# Patient Record
Sex: FEMALE | Race: WHITE | HISPANIC OR LATINO | Employment: OTHER | URBAN - METROPOLITAN AREA
[De-identification: names, ages, dates, MRNs, and addresses within clinical notes are randomized per-mention and may not be internally consistent; named-entity substitution may affect disease eponyms.]

---

## 2017-07-21 ENCOUNTER — HOSPITAL ENCOUNTER (EMERGENCY)
Facility: MEDICAL CENTER | Age: 37
End: 2017-07-21
Attending: EMERGENCY MEDICINE

## 2017-07-21 VITALS
SYSTOLIC BLOOD PRESSURE: 128 MMHG | DIASTOLIC BLOOD PRESSURE: 83 MMHG | OXYGEN SATURATION: 97 % | BODY MASS INDEX: 41.45 KG/M2 | HEART RATE: 85 BPM | WEIGHT: 257.94 LBS | RESPIRATION RATE: 18 BRPM | HEIGHT: 66 IN | TEMPERATURE: 98.7 F

## 2017-07-21 DIAGNOSIS — L03.114 CELLULITIS OF LEFT UPPER EXTREMITY: ICD-10-CM

## 2017-07-21 PROCEDURE — A9270 NON-COVERED ITEM OR SERVICE: HCPCS | Performed by: EMERGENCY MEDICINE

## 2017-07-21 PROCEDURE — 700102 HCHG RX REV CODE 250 W/ 637 OVERRIDE(OP): Performed by: EMERGENCY MEDICINE

## 2017-07-21 PROCEDURE — 99284 EMERGENCY DEPT VISIT MOD MDM: CPT

## 2017-07-21 RX ORDER — CEPHALEXIN 250 MG/1
500 CAPSULE ORAL EVERY 6 HOURS
Status: DISCONTINUED | OUTPATIENT
Start: 2017-07-21 | End: 2017-07-21 | Stop reason: HOSPADM

## 2017-07-21 RX ORDER — CEPHALEXIN 500 MG/1
500 CAPSULE ORAL 4 TIMES DAILY
Refills: 0 | OUTPATIENT
Start: 2017-07-21

## 2017-07-21 RX ORDER — DIPHENHYDRAMINE HCL 25 MG
25 TABLET ORAL ONCE
Status: COMPLETED | OUTPATIENT
Start: 2017-07-21 | End: 2017-07-21

## 2017-07-21 RX ORDER — CEPHALEXIN 500 MG/1
500 CAPSULE ORAL 4 TIMES DAILY
Qty: 40 CAP | Refills: 0 | Status: ON HOLD | OUTPATIENT
Start: 2017-07-21 | End: 2020-12-21

## 2017-07-21 RX ADMIN — DIPHENHYDRAMINE HCL 25 MG: 25 TABLET ORAL at 18:59

## 2017-07-21 RX ADMIN — CEPHALEXIN 500 MG: 250 CAPSULE ORAL at 18:59

## 2017-07-21 NOTE — ED AVS SNAPSHOT
Greenlots Access Code: [email or web address]  Expires: 8/20/2017  7:02 PM    Your email address is not on file at [facility].  Email Addresses are required for you to sign up for Greenlots, please contact [phone] to verify your personal information and to provide your email address prior to attempting to register for Greenlots.    Lidia Salcedo  [address] [place] [address]    Greenlots  A secure, online tool to manage your health information     [facility]’s Greenlots® is a secure, online tool that connects you to your personalized health information from the privacy of your home -- day or night - making it very easy for you to manage your healthcare. Once the activation process is completed, you can even access your medical information using the Greenlots kayy, which is available for free in the Apple Kayy store or Google Play store.     To learn more about Greenlots, visit [email or web address]    There are two levels of access available (as shown below):   My Chart Features  [facility] Primary Care Doctor [facility]  Specialists [facility]  Urgent  Care Non-[facility] Primary Care Doctor   Email your healthcare team securely and privately 24/7 X X X    Manage appointments: schedule your next appointment; view details of past/upcoming appointments X      Request prescription refills. X      View recent personal medical records, including lab and immunizations X X X X   View health record, including health history, allergies, medications X X X X   Read reports about your outpatient visits, procedures, consult and ER notes X X X X   See your discharge summary, which is a recap of your hospital and/or ER visit that includes your diagnosis, lab results, and care plan X X  X     How to register for [facility]:  Once your e-mail address has been verified, follow the following steps to sign up for Greenlots.     1. Go to  [email or web address]  2. Click on the Sign Up Now box, which takes you to the New Member Sign Up page. You will  need to provide the following information:  a. Enter your [employer] Access Code exactly as it appears at the top of this page. (You will not need to use this code after you’ve completed the sign-up process. If you do not sign up before the expiration date, you must request a new code.)   b. Enter your date of birth.   c. Enter your home email address.   d. Click Submit, and follow the next screen’s instructions.  3. Create a Bowntyt ID. This will be your [facility] login ID and cannot be changed, so think of one that is secure and easy to remember.  4. Create a [employer] password. You can change your password at any time.  5. Enter your Password Reset Question and Answer. This can be used at a later time if you forget your password.   6. Enter your e-mail address. This allows you to receive e-mail notifications when new information is available in [employer].  7. Click Sign Up. You can now view your health information.    For assistance activating your [employer] account, call [phone]

## 2017-07-21 NOTE — ED AVS SNAPSHOT
Home Care Instructions                                                                                                                Lidia Salcedo   MRN: [id number]    Department:  [facility], Emergency Dept   Date of Visit:  7/21/2017            [facility], Emergency Dept    [address]    [place] [address]    Phone:  [phone]      You were seen by     Antwon Pedro D.O.      Your Diagnosis Was     Cellulitis of left upper extremity     L03.114       These are the medications you received during your hospitalization from 07/21/2017 1735 to 07/21/2017 1912     Date/Time Order Dose Route Action    07/21/2017 1859 diphenhydrAMINE (BENADRYL) tablet/capsule 25 mg 25 mg Oral Given    07/21/2017 1859 cephALEXin (KEFLEX) capsule 500 mg 500 mg Oral Given      Follow-up Information     1. Follow up with [facility].    Contact information    [facility]  [place] [id number] [phone]        Medication Information     Review all of your home medications and newly ordered medications with your primary doctor and/or pharmacist as soon as possible. Follow medication instructions as directed by your doctor and/or pharmacist.     Please keep your complete medication list with you and share with your physician. Update the information when medications are discontinued, doses are changed, or new medications (including over-the-counter products) are added; and carry medication information at all times in the event of emergency situations.               Medication List      START taking these medications        Instructions    Morning Afternoon Evening Bedtime    cephALEXin 500 MG Caps   Last time this was given:  500 mg on 7/21/2017  6:59 PM   Commonly known as:  KEFLEX        Take 1 Cap by mouth 4 times a day.   Dose:  500 mg                             Where to Get Your Medications      You can get these medications from any pharmacy     Bring a paper  prescription for each of these medications    - cephALEXin 500 MG Caps              Discharge Instructions       Cellulitis  Cellulitis is an infection of the skin and the tissue beneath it. The infected area is usually red and tender. Cellulitis occurs most often in the arms and lower legs.   CAUSES   Cellulitis is caused by bacteria that enter the skin through cracks or cuts in the skin. The most common types of bacteria that cause cellulitis are staphylococci and streptococci.  SIGNS AND SYMPTOMS   · Redness and warmth.  · Swelling.  · Tenderness or pain.  · Fever.  DIAGNOSIS   Your health care provider can usually determine what is wrong based on a physical exam. Blood tests may also be done.  TREATMENT   Treatment usually involves taking an antibiotic medicine.  HOME CARE INSTRUCTIONS   · Take your antibiotic medicine as directed by your health care provider. Finish the antibiotic even if you start to feel better.  · Keep the infected arm or leg elevated to reduce swelling.  · Apply a warm cloth to the affected area up to 4 times per day to relieve pain.  · Take medicines only as directed by your health care provider.  · Keep all follow-up visits as directed by your health care provider.  SEEK MEDICAL CARE IF:   · You notice red streaks coming from the infected area.  · Your red area gets larger or turns dark in color.  · Your bone or joint underneath the infected area becomes painful after the skin has healed.  · Your infection returns in the same area or another area.  · You notice a swollen bump in the infected area.  · You develop new symptoms.  · You have a fever.  SEEK IMMEDIATE MEDICAL CARE IF:   · You feel very sleepy.  · You develop vomiting or diarrhea.  · You have a general ill feeling (malaise) with muscle aches and pains.  MAKE SURE YOU:   · Understand these instructions.  · Will watch your condition.  · Will get help right away if you are not doing well or get worse.     This information is not  intended to replace advice given to you by your health care provider. Make sure you discuss any questions you have with your health care provider.     Document Released: 09/27/2006 Document Revised: 01/08/2016 Document Reviewed: 03/04/2013  Elsevier Interactive Patient Education ©2016 Elsevier Inc.            Patient Information     Patient Information    Following emergency treatment: all patient requiring follow-up care must return either to a private physician or a clinic if your condition worsens before you are able to obtain further medical attention, please return to the emergency room.     Billing Information    At [facility], we work to make the billing process streamlined for our patients.  Our Representatives are here to answer any questions you may have regarding your hospital bill.  If you have insurance coverage and have supplied your insurance information to us, we will submit a claim to your insurer on your behalf.  Should you have any questions regarding your bill, we can be reached online or by phone as follows:  Online: You are able pay your bills online or live chat with our representatives about any billing questions you may have. We are here to help Monday - Friday from 8:00am to 7:30pm and 9:00am - 12:00pm on Saturdays.  Please visit [email or web address]  for more information.   Phone:  [phone] or [phone]    Please note that your emergency physician, surgeon, pathologist, radiologist, anesthesiologist, and other specialists are not employed by [facility] and will therefore bill separately for their services.  Please contact them directly for any questions concerning their bills at the numbers below:     Emergency Physician Services:  [phone]  [facility]:  [phone]  [facility]:  [phone]  [facility]:  [phone]    1. Your final bill may vary from the amount quoted upon discharge if all  procedures are not complete at that time, or if your doctor has additional procedures of which we are not aware. You will receive an additional bill if you return to the Emergency Department at [facility] for suture removal regardless of the facility of which the sutures were placed.     2. Please arrange for settlement of this account at the emergency registration.    3. All self-pay accounts are due in full at the time of treatment.  If you are unable to meet this obligation then payment is expected within 4-5 days.     4. If you have had radiology studies (CT, X-ray, Ultrasound, MRI), you have received a preliminary result during your emergency department visit. Please contact the radiology department [phone] to receive a copy of your final result. Please discuss the Final result with your primary physician or with the follow up physician provided.     Crisis Hotline:  [place] Crisis Hotline:  8-360-QRUZUTP or 1-308.539.7995  [place] Crisis Hotline:    1-960.134.4270 or 593-367-4840         ED Discharge Follow Up Questions    1. In order to provide you with very good care, we would like to follow up with a phone call in the next few days.  May we have your permission to contact you?     YES /  NO    2. What is the best phone number to call you? (       )_____-__________    3. What is the best time to call you?      Morning  /  Afternoon  /  Evening                   Patient Signature:  ____________________________________________________________    Date:  ____________________________________________________________

## 2017-07-21 NOTE — ED AVS SNAPSHOT
7/21/2017    Lidia Salcedo  [address] [place] [address]    Dear Lidia:    [facility] wants to ensure your discharge home is safe and you or your loved ones have had all of your questions answered regarding your care after you leave the hospital.    Below is a list of resources and contact information should you have any questions regarding your hospital stay, follow-up instructions, or active medical symptoms.    Questions or Concerns Regarding… Contact   Medical Questions Related to Your Discharge  (7 days a week, 8am-5pm) Contact a Nurse Care Coordinator   [phone]   Medical Questions Not Related to Your Discharge  (24 hours a day / 7 days a week)  Contact the Nurse Health Line   [phone]    Medications or Discharge Instructions Refer to your discharge packet   or contact your [facility] Primary Care Provider   [phone]   Follow-up Appointment(s) Schedule your appointment via PartyLine   or contact Scheduling [phone]   Billing Review your statement via PartyLine  or contact Billing [phone]   Medical Records Review your records via PartyLine   or contact Medical Records [phone]     You may receive a telephone call within two days of discharge. This call is to make certain you understand your discharge instructions and have the opportunity to have any questions answered. You can also easily access your medical information, test results and upcoming appointments via the PartyLine free online health management tool. You can learn more and sign up at [employer]/PartyLine. For assistance setting up your PartyLine account, please call [phone].    Once again, we want to ensure your discharge home is safe and that you have a clear understanding of any next steps in your care. If you have any questions or concerns, please do not hesitate to contact us, we are here for you. Thank you for choosing [facility] for your healthcare needs.    Sincerely,    Your [facility] Healthcare Team

## 2017-07-22 NOTE — ED NOTES
Patient and caregiver verbalized understanding of discharge instructions, no questions at this time. VS stable, patient will ambulate to exit with d/c instructions and rx in hand.

## 2017-07-22 NOTE — ED NOTES
ERP aware antibiotic order entered incorrectly. Single dose given as ordered by ERP verbal instruction.

## 2017-07-22 NOTE — ED NOTES
Pt BIB caregiver for evaluation of L elbow redness, inflamation. Area hot to touch. Pt reports pain and itching at site, unaware of injury of bite.

## 2017-07-22 NOTE — ED PROVIDER NOTES
"ED Provider Note    CHIEF COMPLAINT  Chief Complaint   Patient presents with   • Elbow Pain       HPI  Lidia Salcedo is a 37 y.o. female here for evaluation of left elbow redness. Patient is here with family, and states that she is unsure as to when the redness occurred. It is located to the medial part of the elbow, but the patient has no pain with moving the elbow, and no fevers. She is not taking anything for the discomfort or pain, and denies any history of the same. She does report some itching. She has no vomiting, no chest, no shortness of breath.    PAST MEDICAL HISTORY    noncontributory    SOCIAL HISTORY  Social History     Social History Main Topics   • Smoking status: Not on file   • Smokeless tobacco: Not on file   • Alcohol Use: Not on file   • Drug Use: Not on file   • Sexual Activity: Not on file       SURGICAL HISTORY  patient denies any surgical history    CURRENT MEDICATIONS  Home Medications     **Home medications have not yet been reviewed for this encounter**          ALLERGIES  Allergies not on file    REVIEW OF SYSTEMS  See HPI for further details. Review of systems as above, otherwise all other systems are negative.     PHYSICAL EXAM  VITAL SIGNS: /80 mmHg  Pulse 90  Temp(Src) 36.7 °C (98 °F)  Resp 18  Ht 1.676 m (5' 6\")  Wt 117 kg (257 lb 15 oz)  BMI 41.65 kg/m2  SpO2 96%    Constitutional: No distress. Well nourished.  HENT: Head is atraumatic. Oropharynx is moist.   Eyes: Conjunctivae are normal. EOMI.   Respiratory: No respiratory distress. Equal chest expansion.   Musculoskeletal: Normal range of motion. No edema.   Neurological: Alert. No focal deficits noted.    Skin: No rash. No Pallor. Left upper extremity, medial aspect of the olecranon, with 4 x 4 area of erythema, no induration, and not circumferential. No pain with range of motion  Psych: Appropriate for clinical situation. Normal affect.      PROCEDURES     MEDICAL RECORD  I have reviewed patient's medical " record and pertinent results are listed above.    COURSE & MEDICAL DECISION MAKING  I have reviewed any medical record information, laboratory studies and radiographic results as noted above.    7:08 PM  The patient is nontoxic-appearing, afebrile, and is no pain with range of motion of the elbow. She's been given Keflex here, and her family agrees to get follow-up in the next 2 days. She will return here for anything further. The area in question has no induration, and is not circumferential.      Differential diagnoses include but not limited to: Septic joint, abscess, cellulitis    This patient presents with elbow cellulitis .  At this time, I have counseled the patient/family regarding their medications, pain control, and follow up.  They will continue their medications, if any, as prescribed.  They will return immediately for any worsening symptoms and/or any other medical concerns.  They will see their doctor, or contact the doctor provided, in 1-2 days for follow up.       FINAL IMPRESSION  Left elbow cellulitis      Electronically signed by: Antwon Pedro, 7/21/2017 7:05 PM

## 2019-01-28 ENCOUNTER — HOSPITAL ENCOUNTER (OUTPATIENT)
Dept: LAB | Facility: MEDICAL CENTER | Age: 39
End: 2019-01-28
Attending: CLINICAL NURSE SPECIALIST
Payer: COMMERCIAL

## 2019-01-28 ENCOUNTER — TELEPHONE (OUTPATIENT)
Dept: LAB | Facility: MEDICAL CENTER | Age: 39
End: 2019-01-28

## 2019-01-28 ENCOUNTER — HOSPITAL ENCOUNTER (OUTPATIENT)
Facility: MEDICAL CENTER | Age: 39
End: 2019-01-28
Attending: FAMILY MEDICINE
Payer: COMMERCIAL

## 2019-01-28 LAB
ANION GAP SERPL CALC-SCNC: 10 MMOL/L (ref 0–11.9)
BUN SERPL-MCNC: 11 MG/DL (ref 8–22)
CALCIUM SERPL-MCNC: 9.5 MG/DL (ref 8.5–10.5)
CHLORIDE SERPL-SCNC: 108 MMOL/L (ref 96–112)
CO2 SERPL-SCNC: 24 MMOL/L (ref 20–33)
CREAT SERPL-MCNC: 0.83 MG/DL (ref 0.5–1.4)
GLUCOSE SERPL-MCNC: 114 MG/DL (ref 65–99)
POTASSIUM SERPL-SCNC: 4 MMOL/L (ref 3.6–5.5)
SODIUM SERPL-SCNC: 142 MMOL/L (ref 135–145)
VALPROATE SERPL-MCNC: 102.2 UG/ML (ref 50–100)

## 2019-01-28 PROCEDURE — 80164 ASSAY DIPROPYLACETIC ACD TOT: CPT

## 2019-01-28 PROCEDURE — 36415 COLL VENOUS BLD VENIPUNCTURE: CPT

## 2019-01-28 PROCEDURE — 80048 BASIC METABOLIC PNL TOTAL CA: CPT

## 2019-10-17 ENCOUNTER — HOSPITAL ENCOUNTER (OUTPATIENT)
Dept: RADIOLOGY | Facility: MEDICAL CENTER | Age: 39
End: 2019-10-17
Attending: PODIATRIST
Payer: COMMERCIAL

## 2019-10-17 DIAGNOSIS — M19.072 ARTHRITIS OF LEFT ANKLE: ICD-10-CM

## 2019-10-17 DIAGNOSIS — M79.672 LEFT FOOT PAIN: ICD-10-CM

## 2019-10-17 DIAGNOSIS — M84.375A STRESS FRACTURE OF LEFT FOOT, INITIAL ENCOUNTER: ICD-10-CM

## 2019-10-17 PROCEDURE — 73630 X-RAY EXAM OF FOOT: CPT | Mod: LT

## 2019-10-17 PROCEDURE — 73610 X-RAY EXAM OF ANKLE: CPT | Mod: LT

## 2020-02-08 ENCOUNTER — HOSPITAL ENCOUNTER (OUTPATIENT)
Dept: LAB | Facility: MEDICAL CENTER | Age: 40
End: 2020-02-08
Attending: CLINICAL NURSE SPECIALIST
Payer: COMMERCIAL

## 2020-02-08 ENCOUNTER — HOSPITAL ENCOUNTER (OUTPATIENT)
Dept: LAB | Facility: MEDICAL CENTER | Age: 40
End: 2020-02-08
Attending: INTERNAL MEDICINE
Payer: COMMERCIAL

## 2020-02-08 LAB
ALBUMIN SERPL BCP-MCNC: 4 G/DL (ref 3.2–4.9)
ALBUMIN/GLOB SERPL: 1.1 G/DL
ALP SERPL-CCNC: 57 U/L (ref 30–99)
ALT SERPL-CCNC: 21 U/L (ref 2–50)
ANION GAP SERPL CALC-SCNC: 10 MMOL/L (ref 0–11.9)
AST SERPL-CCNC: 20 U/L (ref 12–45)
BASOPHILS # BLD AUTO: 0.6 % (ref 0–1.8)
BASOPHILS # BLD: 0.05 K/UL (ref 0–0.12)
BILIRUB SERPL-MCNC: 0.4 MG/DL (ref 0.1–1.5)
BUN SERPL-MCNC: 11 MG/DL (ref 8–22)
CALCIUM SERPL-MCNC: 9.3 MG/DL (ref 8.5–10.5)
CHLORIDE SERPL-SCNC: 101 MMOL/L (ref 96–112)
CO2 SERPL-SCNC: 24 MMOL/L (ref 20–33)
CREAT SERPL-MCNC: 0.62 MG/DL (ref 0.5–1.4)
EOSINOPHIL # BLD AUTO: 0.03 K/UL (ref 0–0.51)
EOSINOPHIL NFR BLD: 0.4 % (ref 0–6.9)
ERYTHROCYTE [DISTWIDTH] IN BLOOD BY AUTOMATED COUNT: 42.5 FL (ref 35.9–50)
EST. AVERAGE GLUCOSE BLD GHB EST-MCNC: 131 MG/DL
GLOBULIN SER CALC-MCNC: 3.5 G/DL (ref 1.9–3.5)
GLUCOSE SERPL-MCNC: 92 MG/DL (ref 65–99)
HBA1C MFR BLD: 6.2 % (ref 0–5.6)
HCT VFR BLD AUTO: 48.6 % (ref 37–47)
HGB BLD-MCNC: 15.9 G/DL (ref 12–16)
IMM GRANULOCYTES # BLD AUTO: 0.06 K/UL (ref 0–0.11)
IMM GRANULOCYTES NFR BLD AUTO: 0.8 % (ref 0–0.9)
LYMPHOCYTES # BLD AUTO: 3.48 K/UL (ref 1–4.8)
LYMPHOCYTES NFR BLD: 44.5 % (ref 22–41)
MCH RBC QN AUTO: 31.4 PG (ref 27–33)
MCHC RBC AUTO-ENTMCNC: 32.7 G/DL (ref 33.6–35)
MCV RBC AUTO: 95.9 FL (ref 81.4–97.8)
MONOCYTES # BLD AUTO: 0.59 K/UL (ref 0–0.85)
MONOCYTES NFR BLD AUTO: 7.5 % (ref 0–13.4)
NEUTROPHILS # BLD AUTO: 3.61 K/UL (ref 2–7.15)
NEUTROPHILS NFR BLD: 46.2 % (ref 44–72)
NRBC # BLD AUTO: 0 K/UL
NRBC BLD-RTO: 0 /100 WBC
PLATELET # BLD AUTO: 245 K/UL (ref 164–446)
PMV BLD AUTO: 11.6 FL (ref 9–12.9)
POTASSIUM SERPL-SCNC: 4 MMOL/L (ref 3.6–5.5)
PROT SERPL-MCNC: 7.5 G/DL (ref 6–8.2)
RBC # BLD AUTO: 5.07 M/UL (ref 4.2–5.4)
SODIUM SERPL-SCNC: 135 MMOL/L (ref 135–145)
TSH SERPL DL<=0.005 MIU/L-ACNC: 2.42 UIU/ML (ref 0.38–5.33)
VALPROATE SERPL-MCNC: 74.6 UG/ML (ref 50–100)
WBC # BLD AUTO: 7.8 K/UL (ref 4.8–10.8)

## 2020-02-08 PROCEDURE — 83036 HEMOGLOBIN GLYCOSYLATED A1C: CPT

## 2020-02-08 PROCEDURE — 80053 COMPREHEN METABOLIC PANEL: CPT

## 2020-02-08 PROCEDURE — 85025 COMPLETE CBC W/AUTO DIFF WBC: CPT

## 2020-02-08 PROCEDURE — 36415 COLL VENOUS BLD VENIPUNCTURE: CPT

## 2020-02-08 PROCEDURE — 84443 ASSAY THYROID STIM HORMONE: CPT

## 2020-02-08 PROCEDURE — 80164 ASSAY DIPROPYLACETIC ACD TOT: CPT

## 2020-12-01 ENCOUNTER — HOSPITAL ENCOUNTER (OUTPATIENT)
Dept: LAB | Facility: MEDICAL CENTER | Age: 40
End: 2020-12-01
Attending: STUDENT IN AN ORGANIZED HEALTH CARE EDUCATION/TRAINING PROGRAM
Payer: COMMERCIAL

## 2020-12-01 LAB
ALBUMIN SERPL BCP-MCNC: 3.9 G/DL (ref 3.2–4.9)
ALBUMIN/GLOB SERPL: 1.2 G/DL
ALP SERPL-CCNC: 67 U/L (ref 30–99)
ALT SERPL-CCNC: 21 U/L (ref 2–50)
ANION GAP SERPL CALC-SCNC: 6 MMOL/L (ref 7–16)
AST SERPL-CCNC: 12 U/L (ref 12–45)
BASOPHILS # BLD AUTO: 0.4 % (ref 0–1.8)
BASOPHILS # BLD: 0.04 K/UL (ref 0–0.12)
BILIRUB SERPL-MCNC: 0.3 MG/DL (ref 0.1–1.5)
BUN SERPL-MCNC: 9 MG/DL (ref 8–22)
CALCIUM SERPL-MCNC: 9.2 MG/DL (ref 8.5–10.5)
CHLORIDE SERPL-SCNC: 108 MMOL/L (ref 96–112)
CHOLEST SERPL-MCNC: 215 MG/DL (ref 100–199)
CO2 SERPL-SCNC: 26 MMOL/L (ref 20–33)
CREAT SERPL-MCNC: 0.75 MG/DL (ref 0.5–1.4)
EOSINOPHIL # BLD AUTO: 0.02 K/UL (ref 0–0.51)
EOSINOPHIL NFR BLD: 0.2 % (ref 0–6.9)
ERYTHROCYTE [DISTWIDTH] IN BLOOD BY AUTOMATED COUNT: 44.3 FL (ref 35.9–50)
EST. AVERAGE GLUCOSE BLD GHB EST-MCNC: 123 MG/DL
FASTING STATUS PATIENT QL REPORTED: NORMAL
GLOBULIN SER CALC-MCNC: 3.2 G/DL (ref 1.9–3.5)
GLUCOSE SERPL-MCNC: 94 MG/DL (ref 65–99)
HBA1C MFR BLD: 5.9 % (ref 0–5.6)
HCT VFR BLD AUTO: 49.3 % (ref 37–47)
HDLC SERPL-MCNC: 45 MG/DL
HGB BLD-MCNC: 15.9 G/DL (ref 12–16)
IMM GRANULOCYTES # BLD AUTO: 0.07 K/UL (ref 0–0.11)
IMM GRANULOCYTES NFR BLD AUTO: 0.7 % (ref 0–0.9)
LDLC SERPL CALC-MCNC: 128 MG/DL
LYMPHOCYTES # BLD AUTO: 3.82 K/UL (ref 1–4.8)
LYMPHOCYTES NFR BLD: 38.2 % (ref 22–41)
MCH RBC QN AUTO: 31.3 PG (ref 27–33)
MCHC RBC AUTO-ENTMCNC: 32.3 G/DL (ref 33.6–35)
MCV RBC AUTO: 97 FL (ref 81.4–97.8)
MONOCYTES # BLD AUTO: 0.67 K/UL (ref 0–0.85)
MONOCYTES NFR BLD AUTO: 6.7 % (ref 0–13.4)
NEUTROPHILS # BLD AUTO: 5.38 K/UL (ref 2–7.15)
NEUTROPHILS NFR BLD: 53.8 % (ref 44–72)
NRBC # BLD AUTO: 0 K/UL
NRBC BLD-RTO: 0 /100 WBC
PLATELET # BLD AUTO: 228 K/UL (ref 164–446)
PMV BLD AUTO: 12.9 FL (ref 9–12.9)
POTASSIUM SERPL-SCNC: 4.3 MMOL/L (ref 3.6–5.5)
PROT SERPL-MCNC: 7.1 G/DL (ref 6–8.2)
RBC # BLD AUTO: 5.08 M/UL (ref 4.2–5.4)
SODIUM SERPL-SCNC: 140 MMOL/L (ref 135–145)
TRIGL SERPL-MCNC: 210 MG/DL (ref 0–149)
WBC # BLD AUTO: 10 K/UL (ref 4.8–10.8)

## 2020-12-01 PROCEDURE — 36415 COLL VENOUS BLD VENIPUNCTURE: CPT

## 2020-12-01 PROCEDURE — 83036 HEMOGLOBIN GLYCOSYLATED A1C: CPT

## 2020-12-01 PROCEDURE — 80053 COMPREHEN METABOLIC PANEL: CPT

## 2020-12-01 PROCEDURE — 80061 LIPID PANEL: CPT

## 2020-12-01 PROCEDURE — 85025 COMPLETE CBC W/AUTO DIFF WBC: CPT

## 2020-12-18 ENCOUNTER — HOSPITAL ENCOUNTER (INPATIENT)
Facility: MEDICAL CENTER | Age: 40
LOS: 2 days | DRG: 177 | End: 2020-12-21
Attending: EMERGENCY MEDICINE | Admitting: STUDENT IN AN ORGANIZED HEALTH CARE EDUCATION/TRAINING PROGRAM
Payer: COMMERCIAL

## 2020-12-18 DIAGNOSIS — U07.1 LAB TEST POSITIVE FOR DETECTION OF COVID-19 VIRUS: ICD-10-CM

## 2020-12-18 DIAGNOSIS — U07.1 COVID-19: ICD-10-CM

## 2020-12-18 DIAGNOSIS — J96.01 ACUTE RESPIRATORY FAILURE WITH HYPOXIA (HCC): ICD-10-CM

## 2020-12-18 DIAGNOSIS — R11.2 NAUSEA AND VOMITING, INTRACTABILITY OF VOMITING NOT SPECIFIED, UNSPECIFIED VOMITING TYPE: ICD-10-CM

## 2020-12-18 DIAGNOSIS — R50.81 FEVER IN OTHER DISEASES: ICD-10-CM

## 2020-12-18 DIAGNOSIS — R62.50 DEVELOPMENTAL DELAY: ICD-10-CM

## 2020-12-18 PROCEDURE — 94760 N-INVAS EAR/PLS OXIMETRY 1: CPT

## 2020-12-18 PROCEDURE — 99285 EMERGENCY DEPT VISIT HI MDM: CPT

## 2020-12-18 ASSESSMENT — FIBROSIS 4 INDEX: FIB4 SCORE: 0.47

## 2020-12-19 ENCOUNTER — APPOINTMENT (OUTPATIENT)
Dept: RADIOLOGY | Facility: MEDICAL CENTER | Age: 40
DRG: 177 | End: 2020-12-19
Attending: EMERGENCY MEDICINE
Payer: COMMERCIAL

## 2020-12-19 PROBLEM — U07.1 COVID-19: Status: ACTIVE | Noted: 2020-12-19

## 2020-12-19 PROBLEM — J96.01 ACUTE RESPIRATORY FAILURE WITH HYPOXIA (HCC): Status: ACTIVE | Noted: 2020-12-19

## 2020-12-19 LAB
ANION GAP SERPL CALC-SCNC: 11 MMOL/L (ref 7–16)
APPEARANCE UR: ABNORMAL
BACTERIA #/AREA URNS HPF: ABNORMAL /HPF
BASOPHILS # BLD AUTO: 0 % (ref 0–1.8)
BASOPHILS # BLD: 0 K/UL (ref 0–0.12)
BILIRUB UR QL STRIP.AUTO: ABNORMAL
BUN SERPL-MCNC: 8 MG/DL (ref 8–22)
CALCIUM SERPL-MCNC: 8.8 MG/DL (ref 8.4–10.2)
CHLORIDE SERPL-SCNC: 103 MMOL/L (ref 96–112)
CO2 SERPL-SCNC: 27 MMOL/L (ref 20–33)
COLOR UR: YELLOW
CREAT SERPL-MCNC: 0.74 MG/DL (ref 0.5–1.4)
CRP SERPL HS-MCNC: 0.78 MG/DL (ref 0–0.75)
D DIMER PPP IA.FEU-MCNC: 0.36 UG/ML (FEU) (ref 0–0.5)
EOSINOPHIL # BLD AUTO: 0 K/UL (ref 0–0.51)
EOSINOPHIL NFR BLD: 0 % (ref 0–6.9)
EPI CELLS #/AREA URNS HPF: ABNORMAL /HPF
ERYTHROCYTE [DISTWIDTH] IN BLOOD BY AUTOMATED COUNT: 44.2 FL (ref 35.9–50)
ERYTHROCYTE [SEDIMENTATION RATE] IN BLOOD BY WESTERGREN METHOD: 15 MM/HOUR (ref 0–20)
GLUCOSE SERPL-MCNC: 157 MG/DL (ref 65–99)
GLUCOSE UR STRIP.AUTO-MCNC: NEGATIVE MG/DL
HCT VFR BLD AUTO: 47.4 % (ref 37–47)
HGB BLD-MCNC: 15.5 G/DL (ref 12–16)
KETONES UR STRIP.AUTO-MCNC: ABNORMAL MG/DL
LEUKOCYTE ESTERASE UR QL STRIP.AUTO: NEGATIVE
LG PLATELETS BLD QL SMEAR: NORMAL
LYMPHOCYTES # BLD AUTO: 2.92 K/UL (ref 1–4.8)
LYMPHOCYTES NFR BLD: 43 % (ref 22–41)
MANUAL DIFF BLD: NORMAL
MCH RBC QN AUTO: 31.1 PG (ref 27–33)
MCHC RBC AUTO-ENTMCNC: 32.7 G/DL (ref 33.6–35)
MCV RBC AUTO: 95.2 FL (ref 81.4–97.8)
MICRO URNS: ABNORMAL
MONOCYTES # BLD AUTO: 0.88 K/UL (ref 0–0.85)
MONOCYTES NFR BLD AUTO: 13 % (ref 0–13.4)
MUCOUS THREADS #/AREA URNS HPF: ABNORMAL /HPF
NEUTROPHILS # BLD AUTO: 2.99 K/UL (ref 2–7.15)
NEUTROPHILS NFR BLD: 38 % (ref 44–72)
NEUTS BAND NFR BLD MANUAL: 6 % (ref 0–10)
NITRITE UR QL STRIP.AUTO: NEGATIVE
NRBC # BLD AUTO: 0.03 K/UL
NRBC BLD-RTO: 0.4 /100 WBC
PH UR STRIP.AUTO: 6 [PH] (ref 5–8)
PLATELET # BLD AUTO: 353 K/UL (ref 164–446)
PLATELET BLD QL SMEAR: NORMAL
PMV BLD AUTO: 10.3 FL (ref 9–12.9)
POLYCHROMASIA BLD QL SMEAR: NORMAL
POTASSIUM SERPL-SCNC: 4.2 MMOL/L (ref 3.6–5.5)
PROT UR QL STRIP: NEGATIVE MG/DL
RBC # BLD AUTO: 4.98 M/UL (ref 4.2–5.4)
RBC # URNS HPF: ABNORMAL /HPF
RBC BLD AUTO: PRESENT
RBC UR QL AUTO: ABNORMAL
SODIUM SERPL-SCNC: 141 MMOL/L (ref 135–145)
SP GR UR REFRACTOMETRY: 1.03
VARIANT LYMPHS BLD QL SMEAR: NORMAL
WBC # BLD AUTO: 6.8 K/UL (ref 4.8–10.8)
WBC #/AREA URNS HPF: ABNORMAL /HPF

## 2020-12-19 PROCEDURE — 99223 1ST HOSP IP/OBS HIGH 75: CPT | Performed by: STUDENT IN AN ORGANIZED HEALTH CARE EDUCATION/TRAINING PROGRAM

## 2020-12-19 PROCEDURE — 700111 HCHG RX REV CODE 636 W/ 250 OVERRIDE (IP): Performed by: STUDENT IN AN ORGANIZED HEALTH CARE EDUCATION/TRAINING PROGRAM

## 2020-12-19 PROCEDURE — A9270 NON-COVERED ITEM OR SERVICE: HCPCS | Performed by: EMERGENCY MEDICINE

## 2020-12-19 PROCEDURE — 700102 HCHG RX REV CODE 250 W/ 637 OVERRIDE(OP): Performed by: STUDENT IN AN ORGANIZED HEALTH CARE EDUCATION/TRAINING PROGRAM

## 2020-12-19 PROCEDURE — 86140 C-REACTIVE PROTEIN: CPT

## 2020-12-19 PROCEDURE — 770021 HCHG ROOM/CARE - ISO PRIVATE

## 2020-12-19 PROCEDURE — A9270 NON-COVERED ITEM OR SERVICE: HCPCS | Performed by: STUDENT IN AN ORGANIZED HEALTH CARE EDUCATION/TRAINING PROGRAM

## 2020-12-19 PROCEDURE — 71045 X-RAY EXAM CHEST 1 VIEW: CPT

## 2020-12-19 PROCEDURE — 80048 BASIC METABOLIC PNL TOTAL CA: CPT

## 2020-12-19 PROCEDURE — 85379 FIBRIN DEGRADATION QUANT: CPT

## 2020-12-19 PROCEDURE — 85652 RBC SED RATE AUTOMATED: CPT

## 2020-12-19 PROCEDURE — 85007 BL SMEAR W/DIFF WBC COUNT: CPT

## 2020-12-19 PROCEDURE — 81001 URINALYSIS AUTO W/SCOPE: CPT

## 2020-12-19 PROCEDURE — 85027 COMPLETE CBC AUTOMATED: CPT

## 2020-12-19 PROCEDURE — 700102 HCHG RX REV CODE 250 W/ 637 OVERRIDE(OP): Performed by: EMERGENCY MEDICINE

## 2020-12-19 RX ORDER — CITALOPRAM 20 MG/1
20 TABLET ORAL EVERY MORNING
Status: DISCONTINUED | OUTPATIENT
Start: 2020-12-19 | End: 2020-12-21 | Stop reason: HOSPADM

## 2020-12-19 RX ORDER — ONDANSETRON 4 MG/1
4 TABLET, ORALLY DISINTEGRATING ORAL EVERY 6 HOURS PRN
Status: DISCONTINUED | OUTPATIENT
Start: 2020-12-19 | End: 2020-12-21 | Stop reason: HOSPADM

## 2020-12-19 RX ORDER — ONDANSETRON 2 MG/ML
4 INJECTION INTRAMUSCULAR; INTRAVENOUS EVERY 6 HOURS PRN
Status: DISCONTINUED | OUTPATIENT
Start: 2020-12-19 | End: 2020-12-21 | Stop reason: HOSPADM

## 2020-12-19 RX ORDER — DULOXETIN HYDROCHLORIDE 30 MG/1
30 CAPSULE, DELAYED RELEASE ORAL DAILY
Status: DISCONTINUED | OUTPATIENT
Start: 2020-12-19 | End: 2020-12-21 | Stop reason: HOSPADM

## 2020-12-19 RX ORDER — ACETAMINOPHEN 325 MG/1
650 TABLET ORAL EVERY 6 HOURS PRN
Status: DISCONTINUED | OUTPATIENT
Start: 2020-12-19 | End: 2020-12-21 | Stop reason: HOSPADM

## 2020-12-19 RX ORDER — DIVALPROEX SODIUM 250 MG/1
250 TABLET, EXTENDED RELEASE ORAL DAILY
Status: DISCONTINUED | OUTPATIENT
Start: 2020-12-19 | End: 2020-12-21 | Stop reason: HOSPADM

## 2020-12-19 RX ORDER — ASCORBIC ACID 500 MG
500 TABLET ORAL EVERY MORNING
Status: DISCONTINUED | OUTPATIENT
Start: 2020-12-19 | End: 2020-12-21 | Stop reason: HOSPADM

## 2020-12-19 RX ORDER — ALBUTEROL SULFATE 90 UG/1
2 AEROSOL, METERED RESPIRATORY (INHALATION) ONCE
Status: COMPLETED | OUTPATIENT
Start: 2020-12-19 | End: 2020-12-19

## 2020-12-19 RX ORDER — DEXAMETHASONE 4 MG/1
6 TABLET ORAL DAILY
Status: DISCONTINUED | OUTPATIENT
Start: 2020-12-19 | End: 2020-12-21 | Stop reason: HOSPADM

## 2020-12-19 RX ADMIN — ENOXAPARIN SODIUM 40 MG: 40 INJECTION SUBCUTANEOUS at 05:56

## 2020-12-19 RX ADMIN — OXYCODONE HYDROCHLORIDE AND ACETAMINOPHEN 500 MG: 500 TABLET ORAL at 05:55

## 2020-12-19 RX ADMIN — DULOXETINE HYDROCHLORIDE 30 MG: 30 CAPSULE, DELAYED RELEASE ORAL at 05:55

## 2020-12-19 RX ADMIN — DEXAMETHASONE 6 MG: 4 TABLET ORAL at 05:55

## 2020-12-19 RX ADMIN — ALBUTEROL SULFATE 2 PUFF: 90 AEROSOL, METERED RESPIRATORY (INHALATION) at 01:42

## 2020-12-19 RX ADMIN — CITALOPRAM HYDROBROMIDE 20 MG: 20 TABLET ORAL at 05:55

## 2020-12-19 RX ADMIN — DIVALPROEX SODIUM 250 MG: 250 TABLET, EXTENDED RELEASE ORAL at 12:03

## 2020-12-19 NOTE — ED PROVIDER NOTES
"CHIEF COMPLAINT  Chief Complaint   Patient presents with   • N/V   • Fever       HPI  Lidia Salcedo is a 40 y.o. female who presents tonight via ambulance from her group home with a chief complaint of hypoxia, fever, nausea with vomiting for the last 24 hours.  Patient apparently tested positive for Covid 2 days ago.  She is developmentally delayed therefore is unable to give any accurate history.  Most of the history was obtained from the paramedics who picked her up from the group home.    REVIEW OF SYSTEMS  See HPI for further details. All other system reviews are negative.    PAST MEDICAL HISTORY  Past Medical History:   Diagnosis Date   • Mental developmental delay     pt mentally \"6 years old\"   • Other specified symptom associated with female genital organs    • Unspecified urinary incontinence        FAMILY HISTORY  History reviewed. No pertinent family history.    SOCIAL HISTORY  Social History     Socioeconomic History   • Marital status: Single     Spouse name: Not on file   • Number of children: Not on file   • Years of education: Not on file   • Highest education level: Not on file   Occupational History   • Not on file   Social Needs   • Financial resource strain: Not on file   • Food insecurity     Worry: Not on file     Inability: Not on file   • Transportation needs     Medical: Not on file     Non-medical: Not on file   Tobacco Use   • Smoking status: Never Smoker   • Smokeless tobacco: Never Used   Substance and Sexual Activity   • Alcohol use: No   • Drug use: No   • Sexual activity: Not on file   Lifestyle   • Physical activity     Days per week: Not on file     Minutes per session: Not on file   • Stress: Not on file   Relationships   • Social connections     Talks on phone: Not on file     Gets together: Not on file     Attends Mu-ism service: Not on file     Active member of club or organization: Not on file     Attends meetings of clubs or organizations: Not on file     Relationship " status: Not on file   • Intimate partner violence     Fear of current or ex partner: Not on file     Emotionally abused: Not on file     Physically abused: Not on file     Forced sexual activity: Not on file   Other Topics Concern   • Not on file   Social History Narrative   • Not on file       SURGICAL HISTORY  Past Surgical History:   Procedure Laterality Date   • HYSTERECTOMY ROBOTIC XI  2/12/2015    Procedure: RIGHT SALPINGECTOMY;  Surgeon: Donald Damon M.D.;  Location: SURGERY [facility];  Service:    • OTHER      cyst removed, ovary removed       CURRENT MEDICATIONS  See nurses notes    ALLERGIES  Allergies   Allergen Reactions   • Nkda [No Known Drug Allergy]        PHYSICAL EXAM  VITAL SIGNS: /76   Pulse 93   Temp 36.3 °C (97.4 °F) (Temporal)   Resp 20   Wt 116.6 kg (257 lb)   SpO2 94%   BMI 41.48 kg/m²   Constitutional: Patient is well developed, well nourished in moderate distress.  HENT: Normocephalic,  Oropharynx moist without erythema or exudates, nose normal with no mucosal edema or drainage.   Eyes: PERRL, EOMI   Neck: Supple with  Normal range of motion in flexion, extension and lateral rotation. No tenderness along the bony prominences or paraspinal muscles.  Lymphatic: No lymphadenopathy noted.   Cardiovascular: Normal heart rate and rhythm. No murmur  Thorax & Lungs: Coarse breath sounds with diminished air exchange diffusely  Abdomen: Bowel sounds normal in all four quadrants. Soft,nontender, no rebound , guarding, palpable masses.   Skin: Warm, Dry, No erythema, No rashes.   Back: No cervical, thoracic, or lumbosacral tenderness.   Extremities: Peripheral pulses 4/4 No edema, No tenderness   Musculoskeletal: Normal range of motion in all major joints.   Neurologic: Alert , Normal motor function, Normal sensory function  Psychiatric: Patient is developmentally delayed.    EKG  Results for orders placed or performed during the hospital encounter of 12/18/20   CBC WITH DIFFERENTIAL    Result Value Ref Range    WBC 6.8 4.8 - 10.8 K/uL    RBC 4.98 4.20 - 5.40 M/uL    Hemoglobin 15.5 12.0 - 16.0 g/dL    Hematocrit 47.4 (H) 37.0 - 47.0 %    MCV 95.2 81.4 - 97.8 fL    MCH 31.1 27.0 - 33.0 pg    MCHC 32.7 (L) 33.6 - 35.0 g/dL    RDW 44.2 35.9 - 50.0 fL    Platelet Count 353 164 - 446 K/uL    MPV 10.3 9.0 - 12.9 fL    Neutrophils-Polys 38.00 (L) 44.00 - 72.00 %    Lymphocytes 43.00 (H) 22.00 - 41.00 %    Monocytes 13.00 0.00 - 13.40 %    Eosinophils 0.00 0.00 - 6.90 %    Basophils 0.00 0.00 - 1.80 %    Nucleated RBC 0.40 /100 WBC    Neutrophils (Absolute) 2.99 2.00 - 7.15 K/uL    Lymphs (Absolute) 2.92 1.00 - 4.80 K/uL    Monos (Absolute) 0.88 (H) 0.00 - 0.85 K/uL    Eos (Absolute) 0.00 0.00 - 0.51 K/uL    Baso (Absolute) 0.00 0.00 - 0.12 K/uL    NRBC (Absolute) 0.03 K/uL   BASIC METABOLIC PANEL   Result Value Ref Range    Sodium 141 135 - 145 mmol/L    Potassium 4.2 3.6 - 5.5 mmol/L    Chloride 103 96 - 112 mmol/L    Co2 27 20 - 33 mmol/L    Glucose 157 (H) 65 - 99 mg/dL    Bun 8 8 - 22 mg/dL    Creatinine 0.74 0.50 - 1.40 mg/dL    Calcium 8.8 8.4 - 10.2 mg/dL    Anion Gap 11.0 7.0 - 16.0   URINALYSIS (UA)    Specimen: Blood   Result Value Ref Range    Color Yellow     Character Hazy (A)     Ph 6.0 5.0 - 8.0    Glucose Negative Negative mg/dL    Ketones Trace (A) Negative mg/dL    Protein Negative Negative mg/dL    Bilirubin Small (A) Negative    Nitrite Negative Negative    Leukocyte Esterase Negative Negative    Occult Blood Trace (A) Negative    Micro Urine Req Microscopic    ESTIMATED GFR   Result Value Ref Range    GFR If African American >60 >60 mL/min/1.73 m 2    GFR If Non African American >60 >60 mL/min/1.73 m 2   REFRACTOMETER SG   Result Value Ref Range    Specific Gravity 1.030    URINE MICROSCOPIC (W/UA)   Result Value Ref Range    WBC 2-5 /hpf    RBC 0-2 /hpf    Bacteria Moderate (A) None /hpf    Epithelial Cells Moderate (A) Few /hpf    Mucous Threads Moderate /hpf   DIFFERENTIAL  MANUAL   Result Value Ref Range    Bands-Stabs 6.00 0.00 - 10.00 %    Manual Diff Status PERFORMED    PLATELET ESTIMATE   Result Value Ref Range    Plt Estimation Normal    MORPHOLOGY   Result Value Ref Range    RBC Morphology Present     Large Platelets 1+     Polychromia 1+     Reactive Lymphocytes Moderate    Sed Rate   Result Value Ref Range    Sed Rate Westergren 15 0 - 20 mm/hour   CRP QUANTITIVE (NON-CARDIAC)   Result Value Ref Range    Stat C-Reactive Protein 0.78 (H) 0.00 - 0.75 mg/dL   D-DIMER   Result Value Ref Range    D-Dimer Screen 0.36 0.00 - 0.50 ug/mL (FEU)         RADIOLOGY/PROCEDURES  DX-CHEST-PORTABLE (1 VIEW)   Final Result         1.  Patchy bilateral lung base opacities concerning for early infiltrates            COURSE & MEDICAL DECISION MAKING  Pertinent Labs & Imaging studies reviewed. (See chart for details)  Patient received an IV of normal saline, chest x-ray shows patchy bilateral opacities consistent with Covid.  D-dimer was negative C-reactive protein was slightly elevated at 0.78 sed rate was normal.  Her urinalysis is hazy but otherwise unremarkable.  Electrolytes are within normal limits.  White blood cell count is normal with a stable H&H.  She received albuterol inhalation solution and was placed on oxygen to keep her O2 sats greater than 92%.  She responded well to 2 L nasal cannula.  She will be admitted to the Covid unit for aggressive beta agonist therapy, steroids, oxygen.  She is currently in guarded condition.    FINAL IMPRESSION  1.  Positive COVID-19 virus  2.  Nausea with vomiting  3.  Fever  4.  Developemental delay         Electronically signed by: Aditi Davis D.O., 12/19/2020 5:49 [facility] Provider Note

## 2020-12-19 NOTE — PROGRESS NOTES
Pt arrived to unit via gurney. Ambulated from gurney to bed, x1 assist. Vitals taken. Pt assessed. A&Ox1. Admit profile completed and med rec unable to obtain due to patient being unable to give information on meds. Discussed POC with pt, including receiving oxygen. Welcome folder provided and discussed. Communication board filled out. Questions and concerns addressed, verbalized understanding. Fall precautions in place. Pt demonstrates ability to use call light appropriately. Pt left in lowest position. Bed locked and lowest position.

## 2020-12-19 NOTE — H&P
Hospital Medicine History & Physical Note    Date of Service  12/19/2020    Primary Care Physician  Pcp Pt States None    Consultants      Code Status  Prior    Chief Complaint  Chief Complaint   Patient presents with   • N/V   • Fever       History of Presenting Illness  40 y.o. female who presented 12/18/2020 with progressive cough and sore throat.  She has developmental delay and obesity.  She lives in a group home, where she was noted to have a fever of 104 F with SPO2 of 88% and was therefore brought to the ER. Her SPO2 increased to 92-93% on 2 LPM oxygen.  She had tested positive for COVID-19 a few days ago and was brought to the ER due to fever and hypoxia.  She cannot answer correctly where she is and why.  She has minimal communication abilities but is able to report her throat hurts.  Further history could not be obtained from the patient. Due to her history of developmental delay, she was not a candidate for transfer to the [facility] at [facility].        Review of Systems  Review of Systems   Unable to perform ROS: Psychiatric disorder       Past Medical History   has a past medical history of Mental developmental delay, Other specified symptom associated with female genital organs, and Unspecified urinary incontinence.    Surgical History   has a past surgical history that includes other and hysterectomy robotic xi (2/12/2015).     Family History  family history is not on file.   Unable to obtain due to inability to communicate.    Social History   reports that she has never smoked. She has never used smokeless tobacco. She reports that she does not drink alcohol or use drugs.    Allergies  Allergies   Allergen Reactions   • Nkda [No Known Drug Allergy]        Medications  Prior to Admission Medications   Prescriptions Last Dose Informant Patient Reported? Taking?   DIVALPROEX SODIUM ER PO   Yes Yes   Sig: Take  by mouth.   DULOXETINE HCL PO   Yes Yes   Sig: Take  by mouth.   Multiple  Vitamins-Minerals (CEROVITE PO)  Family Member Yes No   Sig: Take 1 Tab by mouth every morning.   ascorbic acid (ASCORBIC ACID) 500 MG TABS  Family Member Yes No   Sig: Take 500 mg by mouth every morning.   cephALEXin (KEFLEX) 500 MG Cap Not Taking at Unknown time  No No   Sig: Take 1 Cap by mouth 4 times a day.   Patient not taking: Reported on 12/18/2020   citalopram (CELEXA) 20 MG TABS  Family Member Yes No   Sig: Take 20 mg by mouth every morning.   clonazepam (KLONOPIN) 0.5 MG TABS  Family Member Yes No   Sig: Take 0.25 mg by mouth every bedtime.   ketoconazole (NIZORAL) 2 % shampoo  Family Member Yes No   Sig: Apply  to affected area(s) 1 time daily as needed.   lithium CR (ESKALITH CR) 450 MG TBCR  Family Member Yes No   Sig: Take 450 mg by mouth 2 times a day.   norgestrel-ethinyl estradiol (ELINEST) 0.3-30 MG-MCG TABS  Family Member Yes No   Sig: Take 1 Tab by mouth every morning.   oxycodone-acetaminophen (PERCOCET) 7.5-325 MG per tablet Not Taking at Unknown time  No No   Sig: Take 1-2 Tabs by mouth every 6 hours as needed (pain).   Patient not taking: Reported on 12/18/2020      Facility-Administered Medications: None       Physical Exam  Temp:  [35.8 °C (96.4 °F)] 35.8 °C (96.4 °F)  Pulse:  [105] 105  Resp:  [20] 20  BP: (154)/(83) 154/83  SpO2:  [89 %] 89 %    Physical Exam  Vitals signs and nursing note reviewed.   Constitutional:       General: She is not in acute distress.     Appearance: She is well-developed. She is obese. She is ill-appearing. She is not toxic-appearing or diaphoretic.   HENT:      Head: Normocephalic and atraumatic.      Right Ear: External ear normal.      Left Ear: External ear normal.      Nose: Rhinorrhea present.      Mouth/Throat:      Comments: Mask in place  Eyes:      General: No scleral icterus.        Right eye: No discharge.         Left eye: No discharge.      Conjunctiva/sclera: Conjunctivae normal.      Pupils: Pupils are equal, round, and reactive to light.    Neck:      Musculoskeletal: Neck supple. No neck rigidity or muscular tenderness.      Thyroid: No thyromegaly.      Vascular: No JVD.      Trachea: No tracheal deviation.   Cardiovascular:      Rate and Rhythm: Normal rate and regular rhythm.      Heart sounds: Normal heart sounds. No murmur. No friction rub. No gallop.    Pulmonary:      Effort: Pulmonary effort is normal. No respiratory distress.      Breath sounds: No stridor. Wheezing and rales present.   Abdominal:      General: Bowel sounds are normal. There is no distension.      Palpations: Abdomen is soft. There is no mass.      Tenderness: There is no abdominal tenderness. There is no guarding or rebound.   Musculoskeletal:         General: No tenderness or deformity.      Right lower leg: No edema.      Left lower leg: No edema.   Skin:     General: Skin is warm and dry.      Capillary Refill: Capillary refill takes less than 2 seconds.      Coloration: Skin is not pale.      Findings: No erythema or rash.   Neurological:      Mental Status: She is alert and oriented to person, place, and time.      Motor: No abnormal muscle tone.   Psychiatric:      Comments: Unable to assess due to developmental delay         Laboratory:  Recent Labs     12/19/20  0027   WBC 6.8   RBC 4.98   HEMOGLOBIN 15.5   HEMATOCRIT 47.4*   MCV 95.2   MCH 31.1   MCHC 32.7*   RDW 44.2   PLATELETCT 353   MPV 10.3     Recent Labs     12/19/20  0027   SODIUM 141   POTASSIUM 4.2   CHLORIDE 103   CO2 27   GLUCOSE 157*   BUN 8   CREATININE 0.74   CALCIUM 8.8     Recent Labs     12/19/20  0027   GLUCOSE 157*         No results for input(s): NTPROBNP in the last 72 hours.      No results for input(s): TROPONINT in the last 72 hours.    Imaging:  DX-CHEST-PORTABLE (1 VIEW)   Final Result         1.  Patchy bilateral lung base opacities concerning for early infiltrates        Per my review, CXR shows bibasilar patchy diffuse infiltrates left greater than left.    Urinalysis shows moderate  bacteria and epithelial cells with trace blood.    Assessment/Plan:  I anticipate this patient will require at least two midnights for appropriate medical management, necessitating inpatient admission.    * Acute respiratory failure with hypoxia ([facility])- (present on admission)  Assessment & Plan  SPO2 down to 88% in group home.  Improved with 2 LPM oxygen.  Continue oxygen as per respiratory protocol.  Incentive spirometry.  Dexamethasone 6 mg daily.    Mental developmental delay- (present on admission)  Assessment & Plan  Lives in group home.  Was not a candidate for transfer to [facility] due to developmental delay.    Psychiatric problem- (present on admission)  Assessment & Plan  On multiple psychiatric medications.    Suspect history of bipolar disorder.  Continue psychiatric medications.    COVID-19- (present on admission)  Assessment & Plan  Per history a couple of days ago.  PCR testing ordered.  Check ESR, CRP, and D-dimer.    Morbid obesity with body mass index of 40.0-44.9 in adult ([facility])- (present on admission)  Assessment & Plan  BMI 42.   weight loss.  Mobilize as tolerated.    Bacteriuria with pyuria- (present on admission)  Assessment & Plan  Uncertain whether symptomatic.    Continue to monitor without antibiotics for now.

## 2020-12-19 NOTE — PROGRESS NOTES
Patient's sister Annika and brother in law Navi called for updates. Updates provided. Spoke to them about patient's birth control medication to be brought in and verified with pharmacy.

## 2020-12-19 NOTE — ED TRIAGE NOTES
Positive for covid 19 x 2 days ago, sent in from group home for multiple episodes of n/v/d and fever of 101.2 F pta today. Was given 500 mg tylenol aprox 3-4 hours pta

## 2020-12-19 NOTE — ASSESSMENT & PLAN NOTE
Lives in group home.  Was not a candidate for transfer to [facility] due to developmental delay.

## 2020-12-19 NOTE — ASSESSMENT & PLAN NOTE
On multiple psychiatric medications.    Suspect history of bipolar disorder.  Continue psychiatric medications.

## 2020-12-19 NOTE — ASSESSMENT & PLAN NOTE
SPO2 down to 88% in group home.  Improved with 2 LPM oxygen.  Continue oxygen as per respiratory protocol.  Incentive spirometry.  Dexamethasone 6 mg daily.

## 2020-12-20 LAB
ANION GAP SERPL CALC-SCNC: 14 MMOL/L (ref 7–16)
BASOPHILS # BLD AUTO: 0.7 % (ref 0–1.8)
BASOPHILS # BLD: 0.07 K/UL (ref 0–0.12)
BUN SERPL-MCNC: 10 MG/DL (ref 8–22)
CALCIUM SERPL-MCNC: 9.1 MG/DL (ref 8.4–10.2)
CHLORIDE SERPL-SCNC: 102 MMOL/L (ref 96–112)
CO2 SERPL-SCNC: 22 MMOL/L (ref 20–33)
CREAT SERPL-MCNC: 0.64 MG/DL (ref 0.5–1.4)
EOSINOPHIL # BLD AUTO: 0 K/UL (ref 0–0.51)
EOSINOPHIL NFR BLD: 0 % (ref 0–6.9)
ERYTHROCYTE [DISTWIDTH] IN BLOOD BY AUTOMATED COUNT: 41.4 FL (ref 35.9–50)
FERRITIN SERPL-MCNC: 366 NG/ML (ref 10–291)
GLUCOSE SERPL-MCNC: 175 MG/DL (ref 65–99)
HCT VFR BLD AUTO: 48.1 % (ref 37–47)
HGB BLD-MCNC: 15.8 G/DL (ref 12–16)
IMM GRANULOCYTES # BLD AUTO: 0.76 K/UL (ref 0–0.11)
IMM GRANULOCYTES NFR BLD AUTO: 7.1 % (ref 0–0.9)
LYMPHOCYTES # BLD AUTO: 2.63 K/UL (ref 1–4.8)
LYMPHOCYTES NFR BLD: 24.4 % (ref 22–41)
MCH RBC QN AUTO: 30.9 PG (ref 27–33)
MCHC RBC AUTO-ENTMCNC: 32.8 G/DL (ref 33.6–35)
MCV RBC AUTO: 94.1 FL (ref 81.4–97.8)
MONOCYTES # BLD AUTO: 0.81 K/UL (ref 0–0.85)
MONOCYTES NFR BLD AUTO: 7.5 % (ref 0–13.4)
NEUTROPHILS # BLD AUTO: 6.49 K/UL (ref 2–7.15)
NEUTROPHILS NFR BLD: 60.3 % (ref 44–72)
NRBC # BLD AUTO: 0 K/UL
NRBC BLD-RTO: 0 /100 WBC
PLATELET # BLD AUTO: 407 K/UL (ref 164–446)
PMV BLD AUTO: 11 FL (ref 9–12.9)
POTASSIUM SERPL-SCNC: 4.2 MMOL/L (ref 3.6–5.5)
PROCALCITONIN SERPL-MCNC: <0.02 NG/ML
RBC # BLD AUTO: 5.11 M/UL (ref 4.2–5.4)
SODIUM SERPL-SCNC: 138 MMOL/L (ref 135–145)
WBC # BLD AUTO: 10.8 K/UL (ref 4.8–10.8)

## 2020-12-20 PROCEDURE — 700102 HCHG RX REV CODE 250 W/ 637 OVERRIDE(OP): Performed by: STUDENT IN AN ORGANIZED HEALTH CARE EDUCATION/TRAINING PROGRAM

## 2020-12-20 PROCEDURE — A9270 NON-COVERED ITEM OR SERVICE: HCPCS | Performed by: STUDENT IN AN ORGANIZED HEALTH CARE EDUCATION/TRAINING PROGRAM

## 2020-12-20 PROCEDURE — 82728 ASSAY OF FERRITIN: CPT

## 2020-12-20 PROCEDURE — 80048 BASIC METABOLIC PNL TOTAL CA: CPT

## 2020-12-20 PROCEDURE — 770021 HCHG ROOM/CARE - ISO PRIVATE

## 2020-12-20 PROCEDURE — 700111 HCHG RX REV CODE 636 W/ 250 OVERRIDE (IP): Performed by: STUDENT IN AN ORGANIZED HEALTH CARE EDUCATION/TRAINING PROGRAM

## 2020-12-20 PROCEDURE — 85025 COMPLETE CBC W/AUTO DIFF WBC: CPT

## 2020-12-20 PROCEDURE — 99233 SBSQ HOSP IP/OBS HIGH 50: CPT | Performed by: STUDENT IN AN ORGANIZED HEALTH CARE EDUCATION/TRAINING PROGRAM

## 2020-12-20 PROCEDURE — 84145 PROCALCITONIN (PCT): CPT

## 2020-12-20 RX ORDER — VITAMIN B COMPLEX
1000 TABLET ORAL DAILY
Status: DISCONTINUED | OUTPATIENT
Start: 2020-12-20 | End: 2020-12-21 | Stop reason: HOSPADM

## 2020-12-20 RX ORDER — ZINC SULFATE 50(220)MG
220 CAPSULE ORAL DAILY
Status: DISCONTINUED | OUTPATIENT
Start: 2020-12-20 | End: 2020-12-21 | Stop reason: HOSPADM

## 2020-12-20 RX ADMIN — CITALOPRAM HYDROBROMIDE 20 MG: 20 TABLET ORAL at 05:43

## 2020-12-20 RX ADMIN — DEXAMETHASONE 6 MG: 4 TABLET ORAL at 05:43

## 2020-12-20 RX ADMIN — ZINC SULFATE 220 MG (50 MG) CAPSULE 220 MG: CAPSULE at 18:52

## 2020-12-20 RX ADMIN — Medication 1000 UNITS: at 18:53

## 2020-12-20 RX ADMIN — ENOXAPARIN SODIUM 40 MG: 40 INJECTION SUBCUTANEOUS at 05:43

## 2020-12-20 RX ADMIN — DULOXETINE HYDROCHLORIDE 30 MG: 30 CAPSULE, DELAYED RELEASE ORAL at 05:40

## 2020-12-20 RX ADMIN — OXYCODONE HYDROCHLORIDE AND ACETAMINOPHEN 500 MG: 500 TABLET ORAL at 05:41

## 2020-12-20 RX ADMIN — DIVALPROEX SODIUM 250 MG: 250 TABLET, EXTENDED RELEASE ORAL at 05:43

## 2020-12-20 NOTE — PROGRESS NOTES
Patient's mother updated on pt status and possible discharge plan. Patient no c/o this shift, sat >92% on 2L NC, will possibly d/c to group home with oxygen per MD. Will continue to monitor oxygenation status and promote independence within the limits.

## 2020-12-20 NOTE — DIETARY
NUTRITION SERVICES: BMI - Pt with BMI >40 (=Body mass index is 41.48 kg/m².), morbid obesity. Weight loss counseling not appropriate in acute care setting.     RECOMMEND - Referral to outpatient nutrition services for weight management after D/C as appropriate.

## 2020-12-20 NOTE — DISCHARGE PLANNING
Anticipated Discharge Disposition:   Group home    Action:   RN CM called [facility] [place] , transferred to [facility] ([phone]), . Per [facility], no need for COVID retest because pt's housemates all tested positive. They do not have oxygen st the group home, pt will need to be set up with home oxygen before discharge. They also have transport available to  pt if needed.     Barriers to Discharge:   Medical clearance  Home oxygen set up, pending home oxygen eval.    Plan:   Hospital Care Management will continue to follow and assist with discharge planning needs.

## 2020-12-20 NOTE — PROGRESS NOTES
Pt. Is alert and has no complaints of pain. No SOB or laborded breathing. On supplemental 02 to keep Sp02 above 90%. Pt. Has some difficulty following commands or directions at times. Able to communicate well verbal and with gestures. Pt. Is not able to tell me where she is currently at. Pt. Under no distress. Will continue to monitor.

## 2020-12-20 NOTE — DISCHARGE PLANNING
ER CM spoke with Mother Edgar via cell phone. She directed CM to group home regarding dc planning assessment and baseline. Mother reports some frustration with getting condition updates and notes that they will pick one person to be information seeker as directed by RN.  Group home [place] spoke with Alicia. PCP unknown .RX via [facility] mail order. Home is 1 story with rails thru out. She was slow ambulation with rails prior to becoming ill. She did requiire assist with ADLS. She did not have DME or o2 prior to coming in. Group home does provide transport. Group home number different than listed earlier in chart .  Care Transition Team Assessment    Information Source  Orientation : Disoriented to Person  Information Given By: Parent  Informant's Name: Edgar  Who is responsible for making decisions for patient? : Legal next of kin  Name(s) of Primary Decision Maker: Edgar/Navi              Interdisciplinary Discharge Planning  Lives with - Patient's Self Care Capacity: Other (Comments)  Support Systems: Parent, Other (Comments)  Housing / Facility: 1 Story House  Do You Take your Prescribed Medications Regularly: Yes  Able to Return to Previous ADL's: Yes  Mobility Issues: Yes  Prior Services: Continuous (24 Hour) Care Giving Per Service  Assistance Needed: Yes  Durable Medical Equipment: Not Applicable    Discharge Preparedness  What is your plan after discharge?: Group home         Finances  Pres[facility] Coverage: Yes([employer] [facility]    Vision / Hearing Impairment  Vision Impairment : Yes              Domestic Abuse  Have you ever been the victim of abuse or violence?: No    Psychological Assessment  History of Substance Abuse: None         Anticipated Discharge Information  Discharge Disposition: Still a Patient (30)

## 2020-12-20 NOTE — FACE TO FACE
"Face to Face Note  -  Durable Medical Equipment    Aldo Bonilla M.D. - NPI: [id number]  I certify that this patient is under my care and that they had a durable medical equipment(DME)face to face encounter by myself that meets the physician DME face-to-face encounter requirements with this patient on:    Date of encounter:   Patient:                    MRN:                       YOB: 2021  Lidia Salcedo  [id number]  1980     The encounter with the patient was in whole, or in part, for the following medical condition, which is the primary reason for durable medical equipment:  Other - Covid Pneumonia    I certify that, based on my findings, the following durable medical equipment is medically necessary:  Oxygen.    HOME O2 Saturation Measurements:(Values must be present for Home Oxygen orders)  Room air sat at rest: 90  Room air sat with amb: 85  With liters of O2: 2, O2 sat at rest with O2: 96  With Liters of O2: 2, O2 sat with amb with O2 : 92  Is the patient mobile?: Yes    My Clinical findings support the need for the above equipment due to:  Hypoxia    Supporting Symptoms: The patient requires supplemental oxygen, as the following interventions have been tried with limited or no improvement: \"Ambulation with oximetry    If patient feels more short of breath, they can go up to 6 liters per minute and contact healthcare provider.  "

## 2020-12-20 NOTE — PROGRESS NOTES
Ms Salcedo is a 40 y.o Obese f pmh of developmental delay , with baseline minimal communication abilities, sent from  with worsening cough and sore throat, fever of 104 F and hypoxia with O2 sat of 88% needing 2 LPM oxygen maintaining sat at 92%. Patient tested positive for Covid 19.  She was started on Dexamethasone for hypoxia from covid.      Plan:  C/w Dexamethasone, encourage proning, IS  Titrate down oxygen as tolerated  Remdesvir not indicated   F/u procal no indication for abx  C/w with home psych meds  C/w Lovenox for DVT prophylaxis

## 2020-12-21 VITALS
TEMPERATURE: 98.1 F | RESPIRATION RATE: 18 BRPM | WEIGHT: 257.06 LBS | HEIGHT: 66 IN | SYSTOLIC BLOOD PRESSURE: 135 MMHG | DIASTOLIC BLOOD PRESSURE: 80 MMHG | BODY MASS INDEX: 41.31 KG/M2 | HEART RATE: 98 BPM | OXYGEN SATURATION: 95 %

## 2020-12-21 LAB
ANION GAP SERPL CALC-SCNC: 10 MMOL/L (ref 7–16)
BASOPHILS # BLD AUTO: 0.8 % (ref 0–1.8)
BASOPHILS # BLD: 0.12 K/UL (ref 0–0.12)
BUN SERPL-MCNC: 12 MG/DL (ref 8–22)
CALCIUM SERPL-MCNC: 9.1 MG/DL (ref 8.4–10.2)
CHLORIDE SERPL-SCNC: 102 MMOL/L (ref 96–112)
CO2 SERPL-SCNC: 26 MMOL/L (ref 20–33)
CREAT SERPL-MCNC: 0.64 MG/DL (ref 0.5–1.4)
EOSINOPHIL # BLD AUTO: 0 K/UL (ref 0–0.51)
EOSINOPHIL NFR BLD: 0 % (ref 0–6.9)
ERYTHROCYTE [DISTWIDTH] IN BLOOD BY AUTOMATED COUNT: 42.1 FL (ref 35.9–50)
GLUCOSE SERPL-MCNC: 120 MG/DL (ref 65–99)
HCT VFR BLD AUTO: 47 % (ref 37–47)
HGB BLD-MCNC: 15.4 G/DL (ref 12–16)
IMM GRANULOCYTES # BLD AUTO: 1.02 K/UL (ref 0–0.11)
IMM GRANULOCYTES NFR BLD AUTO: 7 % (ref 0–0.9)
LYMPHOCYTES # BLD AUTO: 4.1 K/UL (ref 1–4.8)
LYMPHOCYTES NFR BLD: 28.3 % (ref 22–41)
MCH RBC QN AUTO: 30.7 PG (ref 27–33)
MCHC RBC AUTO-ENTMCNC: 32.8 G/DL (ref 33.6–35)
MCV RBC AUTO: 93.6 FL (ref 81.4–97.8)
MONOCYTES # BLD AUTO: 1.32 K/UL (ref 0–0.85)
MONOCYTES NFR BLD AUTO: 9.1 % (ref 0–13.4)
NEUTROPHILS # BLD AUTO: 7.94 K/UL (ref 2–7.15)
NEUTROPHILS NFR BLD: 54.8 % (ref 44–72)
NRBC # BLD AUTO: 0 K/UL
NRBC BLD-RTO: 0 /100 WBC
PLATELET # BLD AUTO: 407 K/UL (ref 164–446)
PMV BLD AUTO: 11 FL (ref 9–12.9)
POTASSIUM SERPL-SCNC: 4 MMOL/L (ref 3.6–5.5)
RBC # BLD AUTO: 5.02 M/UL (ref 4.2–5.4)
SODIUM SERPL-SCNC: 138 MMOL/L (ref 135–145)
WBC # BLD AUTO: 14.5 K/UL (ref 4.8–10.8)

## 2020-12-21 PROCEDURE — 99239 HOSP IP/OBS DSCHRG MGMT >30: CPT | Performed by: STUDENT IN AN ORGANIZED HEALTH CARE EDUCATION/TRAINING PROGRAM

## 2020-12-21 PROCEDURE — A9270 NON-COVERED ITEM OR SERVICE: HCPCS | Performed by: STUDENT IN AN ORGANIZED HEALTH CARE EDUCATION/TRAINING PROGRAM

## 2020-12-21 PROCEDURE — 85025 COMPLETE CBC W/AUTO DIFF WBC: CPT

## 2020-12-21 PROCEDURE — 80048 BASIC METABOLIC PNL TOTAL CA: CPT

## 2020-12-21 PROCEDURE — 700102 HCHG RX REV CODE 250 W/ 637 OVERRIDE(OP): Performed by: STUDENT IN AN ORGANIZED HEALTH CARE EDUCATION/TRAINING PROGRAM

## 2020-12-21 PROCEDURE — 700111 HCHG RX REV CODE 636 W/ 250 OVERRIDE (IP): Performed by: STUDENT IN AN ORGANIZED HEALTH CARE EDUCATION/TRAINING PROGRAM

## 2020-12-21 RX ORDER — DEXAMETHASONE 6 MG/1
6 TABLET ORAL DAILY
Qty: 6 TAB | Refills: 0 | Status: SHIPPED | OUTPATIENT
Start: 2020-12-22 | End: 2020-12-28

## 2020-12-21 RX ORDER — ZINC SULFATE 50(220)MG
220 CAPSULE ORAL DAILY
Qty: 30 CAP | Refills: 3 | Status: SHIPPED | OUTPATIENT
Start: 2020-12-22 | End: 2022-06-27

## 2020-12-21 RX ADMIN — DEXAMETHASONE 6 MG: 4 TABLET ORAL at 04:53

## 2020-12-21 RX ADMIN — DULOXETINE HYDROCHLORIDE 30 MG: 30 CAPSULE, DELAYED RELEASE ORAL at 04:54

## 2020-12-21 RX ADMIN — CITALOPRAM HYDROBROMIDE 20 MG: 20 TABLET ORAL at 04:54

## 2020-12-21 RX ADMIN — ENOXAPARIN SODIUM 40 MG: 40 INJECTION SUBCUTANEOUS at 04:54

## 2020-12-21 RX ADMIN — Medication 1000 UNITS: at 04:54

## 2020-12-21 RX ADMIN — ZINC SULFATE 220 MG (50 MG) CAPSULE 220 MG: CAPSULE at 04:54

## 2020-12-21 RX ADMIN — OXYCODONE HYDROCHLORIDE AND ACETAMINOPHEN 500 MG: 500 TABLET ORAL at 04:53

## 2020-12-21 RX ADMIN — DIVALPROEX SODIUM 250 MG: 250 TABLET, EXTENDED RELEASE ORAL at 04:54

## 2020-12-21 ASSESSMENT — FIBROSIS 4 INDEX
FIB4 SCORE: 0.26
FIB4 SCORE: 0.26

## 2020-12-21 NOTE — DISCHARGE SUMMARY
"Discharge Summary    CHIEF COMPLAINT ON ADMISSION  Chief Complaint   Patient presents with   • N/V   • Fever       Reason for Admission  Acute respiratory failure with hypoxia    CODE STATUS  Full Code    HPI & HOSPITAL COURSE  Ms Salcedo is a 40 y.o Obese f pmh of developmental delay , with baseline minimal communication abilities, sent from  with worsening cough and sore throat, fever of 104 F and hypoxia with O2 sat of 88% needing 2 LPM oxygen maintaining sat at 92%. Patient tested positive for Covid 19.  She was started on Dexamethasone for hypoxia from covid.    During the course of hospitalization her oxygen sats were consistently above 90% and she needed 2 L/min of oxygen via nasal cannula to maintain her saturation above 90%.  She is not a candidate for remdesivir.  Her pro-Mahad was negative no antibiotics indicated.  She was evaluated for home oxygen and she is a candidate for home oxygen.  Patient to continue dexamethasone for 6 more days with end date of 12/28/20.  Patient to be evaluated for hypoxia at the group home return to ER if worsening hypoxia.  Patient urine analysis noted to be positive for bacteria and pyuria but patient is asymptomatic, afebrile, pro-Mahad negative will not treat with antibiotic.  Patient also noted to have leukocytosis likely secondary from the steroids.  No notes on file    Therefore, she is discharged in good and stable condition to home with close outpatient follow-up.    The patient met 2-midnight criteria for an inpatient stay at the time of discharge.      FOLLOW UP ITEMS POST DISCHARGE  12/21/20    DISCHARGE DIAGNOSES  Principal Problem:    Acute respiratory failure with hypoxia (HCC) POA: Yes  Active Problems:    Mental developmental delay POA: Yes      Overview: pt mentally \"6 years old\"    COVID-19 POA: Yes    Psychiatric problem POA: Yes    Bacteriuria with pyuria POA: Yes    Morbid obesity with body mass index of 40.0-44.9 in adult (HCC) (Chronic) POA: " Yes  Resolved Problems:    * No resolved hospital problems. *      FOLLOW UP  No future appointments.  No follow-up provider specified.    MEDICATIONS ON DISCHARGE     Medication List      START taking these medications      Instructions   dexamethasone 6 MG Tabs  Start taking on: December 22, 2020  Commonly known as: DECADRON   Take 1 Tab by mouth every day for 6 days.  Dose: 6 mg     vitamin D 1000 UNIT Tabs  Start taking on: December 22, 2020  Commonly known as: VITAMIND D3   Take 1 Tab by mouth every day.  Dose: 1,000 Units     zinc sulfate 220 (50 Zn) MG Caps  Start taking on: December 22, 2020  Commonly known as: ZINCATE   Take 1 Cap by mouth every day.  Dose: 220 mg        CONTINUE taking these medications      Instructions   ascorbic acid 500 MG Tabs  Commonly known as: ascorbic acid   Take 500 mg by mouth every morning.  Dose: 500 mg     CEROVITE PO   Take 1 Tab by mouth every morning.  Dose: 1 Tab     citalopram 20 MG Tabs  Commonly known as: CeleXA   Take 20 mg by mouth every morning.  Dose: 20 mg     clonazePAM 0.5 MG Tabs  Commonly known as: KLONOPIN   Take 0.25 mg by mouth every bedtime.  Dose: 0.25 mg     DIVALPROEX SODIUM ER PO   Take  by mouth.     DULOXETINE HCL PO   Take  by mouth.     Elinest 0.3-30 MG-MCG Tabs  Generic drug: norgestrel-ethinyl estradiol   Take 1 Tab by mouth every morning.  Dose: 1 Tab     ketoconazole 2 % shampoo  Commonly known as: NIZORAL   Apply  to affected area(s) 1 time daily as needed.     lithium  MG Tbcr  Commonly known as: ESKALITH CR   Take 450 mg by mouth 2 times a day.  Dose: 450 mg     oxyCODONE-acetaminophen 7.5-325 MG per tablet  Commonly known as: PERCOCET   Take 1-2 Tabs by mouth every 6 hours as needed (pain).  Dose: 1-2 Tab        STOP taking these medications    cephALEXin 500 MG Caps  Commonly known as: KEFLEX            Allergies  Allergies   Allergen Reactions   • Nkda [No Known Drug Allergy]        DIET  Orders Placed This Encounter   Procedures    • Diet Order Diet: Regular     Standing Status:   Standing     Number of Occurrences:   1     Order Specific Question:   Diet:     Answer:   Regular [1]       ACTIVITY  As tolerated.  Weight bearing as tolerated    LINES, DRAINS, AND WOUNDS  This is an automated list. Peripheral IVs will be removed prior to discharge.  Peripheral IV 12/20/20 Left;Posterior Hand (Active)   Site Assessment Clean;Dry;Intact 12/20/20 2300   Dressing Type Transparent 12/20/20 2300   Line Status Blood return noted;Flushed 12/20/20 2300   Dressing Status Clean;Dry;Intact 12/20/20 2300          Peripheral IV 12/20/20 Left;Posterior Hand (Active)   Site Assessment Clean;Dry;Intact 12/20/20 2300   Dressing Type Transparent 12/20/20 2300   Line Status Blood return noted;Flushed 12/20/20 2300   Dressing Status Clean;Dry;Intact 12/20/20 2300               MENTAL STATUS ON TRANSFER  Level of Consciousness: Alert  Orientation : Disoriented to Person       CONSULTATIONS  none    PROCEDURES  none    LABORATORY  Lab Results   Component Value Date    SODIUM 138 12/21/2020    POTASSIUM 4.0 12/21/2020    CHLORIDE 102 12/21/2020    CO2 26 12/21/2020    GLUCOSE 120 (H) 12/21/2020    BUN 12 12/21/2020    CREATININE 0.64 12/21/2020    CREATININE 0.7 01/27/2006        Lab Results   Component Value Date    WBC 14.5 (H) 12/21/2020    HEMOGLOBIN 15.4 12/21/2020    HEMATOCRIT 47.0 12/21/2020    PLATELETCT 407 12/21/2020        Total time of the discharge process exceeds 32 minutes.

## 2020-12-21 NOTE — PROGRESS NOTES
Hospital Medicine Daily Progress Note    Date of Service  12/20/2020    Chief Complaint  40 y.o. female admitted 12/18/2020 with    Hospital Course  No notes on file  Ms Salcedo is a 40 y.o Obese f pmh of developmental delay , with baseline minimal communication abilities, sent from  with worsening cough and sore throat, fever of 104 F and hypoxia with O2 sat of 88% needing 2 LPM oxygen maintaining sat at 92%. Patient tested positive for Covid 19.  She was started on Dexamethasone for hypoxia from covid.    During the course of hospitalization her oxygen sats were consistently above 90% and she needed 2 L/min of oxygen via nasal cannula to maintain her saturation above 90%.  She is not a candidate for remdesivir.  Her pro-Mahad was negative no antibiotics indicated.  She was evaluated for home oxygen and she is a candidate for home oxygen.  Interval Problem Update  No acute overnight events, vital signs stable, maintaining saturation above 90% with supplemental oxygen 2 L/min, labs within normal limits  Continue with dexamethasone, oxygen, Lovenox for DVT prophylaxis  Encourage proning and IS    Consultants/Specialty  NONE    Code Status  Full Code    Disposition  Group Home on 12/21/20    Review of Systems  Review of Systems   Unable to perform ROS: Mental acuity        Physical Exam  Temp:  [36.1 °C (97 °F)-36.8 °C (98.3 °F)] 36.1 °C (97 °F)  Pulse:  [] 97  Resp:  [18] 18  BP: (125-133)/(81-85) 125/85  SpO2:  [94 %-96 %] 96 %    Physical Exam  Vitals signs and nursing note reviewed.   Constitutional:       Appearance: Normal appearance.   HENT:      Head: Normocephalic and atraumatic.      Right Ear: External ear normal.      Left Ear: External ear normal.      Mouth/Throat:      Mouth: Mucous membranes are moist.   Eyes:      Extraocular Movements: Extraocular movements intact.      Conjunctiva/sclera: Conjunctivae normal.      Pupils: Pupils are equal, round, and reactive to light.   Neck:       Musculoskeletal: Normal range of motion and neck supple.   Cardiovascular:      Rate and Rhythm: Normal rate and regular rhythm.      Pulses: Normal pulses.      Heart sounds: Normal heart sounds.   Pulmonary:      Effort: Pulmonary effort is normal.      Breath sounds: Normal breath sounds.   Abdominal:      General: Abdomen is flat. Bowel sounds are normal.      Palpations: Abdomen is soft.   Musculoskeletal: Normal range of motion.   Skin:     General: Skin is warm.      Capillary Refill: Capillary refill takes less than 2 seconds.   Neurological:      General: No focal deficit present.      Mental Status: She is alert and oriented to person, place, and time.   Psychiatric:         Mood and Affect: Mood normal.         Fluids    Intake/Output Summary (Last 24 hours) at 12/20/2020 1701  Last data filed at 12/20/2020 0700  Gross per 24 hour   Intake --   Output 400 ml   Net -400 ml       Laboratory  Recent Labs     12/19/20  0027 12/20/20  0159   WBC 6.8 10.8   RBC 4.98 5.11   HEMOGLOBIN 15.5 15.8   HEMATOCRIT 47.4* 48.1*   MCV 95.2 94.1   MCH 31.1 30.9   MCHC 32.7* 32.8*   RDW 44.2 41.4   PLATELETCT 353 407   MPV 10.3 11.0     Recent Labs     12/19/20  0027 12/20/20  0159   SODIUM 141 138   POTASSIUM 4.2 4.2   CHLORIDE 103 102   CO2 27 22   GLUCOSE 157* 175*   BUN 8 10   CREATININE 0.74 0.64   CALCIUM 8.8 9.1                   Imaging  DX-CHEST-PORTABLE (1 VIEW)   Final Result         1.  Patchy bilateral lung base opacities concerning for early infiltrates           Assessment/Plan  * Acute respiratory failure with hypoxia (HCC)- (present on admission)  Assessment & Plan  SPO2 down to 88% in group home.  Improved with 2 LPM oxygen.  Continue oxygen as per respiratory protocol.  Incentive spirometry.  Dexamethasone 6 mg daily.    Mental developmental delay- (present on admission)  Assessment & Plan  Lives in group home.  Was not a candidate for transfer to [facility] due to developmental  delay.    Psychiatric problem- (present on admission)  Assessment & Plan  On multiple psychiatric medications.    Suspect history of bipolar disorder.  Continue psychiatric medications.    COVID-19- (present on admission)  Assessment & Plan  Per history a couple of days ago.  PCR testing ordered.  Check ESR, CRP, and D-dimer.    Morbid obesity with body mass index of 40.0-44.9 in adult (HCC)- (present on admission)  Assessment & Plan  BMI 42.   weight loss.  Mobilize as tolerated.    Bacteriuria with pyuria- (present on admission)  Assessment & Plan  Uncertain whether symptomatic.    Continue to monitor without antibiotics for now.         VTE prophylaxis: Lovenox

## 2020-12-21 NOTE — DISCHARGE PLANNING
Agency/Facility Name: [facility]  Spoke To: Shay  Outcome: Declined.  Non-contracted insurance provider.    Agency/Facility Name: [facility]  Referral sent per Choice form @ [id number]

## 2020-12-21 NOTE — DISCHARGE PLANNING
Anticipated Discharge Disposition: Back to group home with home O2    Action: Spoke with Sasha ([phone]),  and she stated that they are ready to  the patient at any time.   Spoke with Patient's mom Edgar ([phone]) about [facility] provider choices for home O2. Vibha picked [facility] as #1 choice and [facility] as her #2 choice.    Choice form faxed to [facility].    Barriers to Discharge: O2 delivery    Plan: Will follow up on O2.

## 2020-12-21 NOTE — DISCHARGE PLANNING
Agency/Facility Name: [facility]  Spoke To: Jesus  Outcome: [facility] is on the way to the Pt. Should be there within 2 hours.

## 2020-12-21 NOTE — DISCHARGE PLANNING
Received Choice form at 1457  Agency/Facility Name:1- [facility], 2- [facility]  Referral sent per Choice form @ [phone]

## 2020-12-21 NOTE — DISCHARGE PLANNING
Agency/Facility Name: [facility]  Spoke To: Jesus  Outcome: Referral received.  Will be processed shortly.

## 2020-12-22 NOTE — PROGRESS NOTES
Discharge instructions discussed with Sasha from Group Home and copy given. All questions answered. Pt taken to [facility] car via WC with all belongings including oxygen delivery. Instructions provided on who to call when they get home for further oxygen deliveries

## 2020-12-22 NOTE — DISCHARGE INSTRUCTIONS
Discharge Instructions    Discharged to group home by car with escort. Discharged via wheelchair, hospital escort: Yes.  Special equipment needed: Oxygen    Be sure to schedule a follow-up appointment with your primary care doctor or any specialists as instructed.     Discharge Plan:   Diet Plan: Discussed  Activity Level: Discussed  Confirmed Follow up Appointment: Patient to Call and Schedule Appointment  Confirmed Symptoms Management: Discussed  Medication Reconciliation Updated: Yes  Influenza Vaccine Indication: Patient Refuses    I understand that a diet low in cholesterol, fat, and sodium is recommended for good health. Unless I have been given specific instructions below for another diet, I accept this instruction as my diet prescription.   Other diet: Regular    Special Instructions: None    · Is patient discharged on Warfarin / Coumadin?   No   COVID-19  COVID-19 is a respiratory infection that is caused by a virus called severe acute respiratory syndrome coronavirus 2 (SARS-CoV-2). The disease is also known as coronavirus disease or novel coronavirus. In some people, the virus may not cause any symptoms. In others, it may cause a serious infection. The infection can get worse quickly and can lead to complications, such as:  · Pneumonia, or infection of the lungs.  · Acute respiratory distress syndrome or ARDS. This is fluid build-up in the lungs.  · Acute respiratory failure. This is a condition in which there is not enough oxygen passing from the lungs to the body.  · Sepsis or septic shock. This is a serious bodily reaction to an infection.  · Blood clotting problems.  · Secondary infections due to bacteria or fungus.  The virus that causes COVID-19 is contagious. This means that it can spread from person to person through droplets from coughs and sneezes (respiratory secretions).  What are the causes?  This illness is caused by a virus. You may catch the virus by:  · Breathing in droplets from an  infected person's cough or sneeze.  · Touching something, like a table or a doorknob, that was exposed to the virus (contaminated) and then touching your mouth, nose, or eyes.  What increases the risk?  Risk for infection  You are more likely to be infected with this virus if you:  · Live in or travel to an area with a COVID-19 outbreak.  · Come in contact with a sick person who recently traveled to an area with a COVID-19 outbreak.  · Provide care for or live with a person who is infected with COVID-19.  Risk for serious illness  You are more likely to become seriously ill from the virus if you:  · Are 65 years of age or older.  · Have a long-term disease that lowers your body's ability to fight infection (immunocompromised).  · Live in a nursing home or long-term care facility.  · Have a long-term (chronic) disease such as:  ? Chronic lung disease, including chronic obstructive pulmonary disease or asthma  ? Heart disease.  ? Diabetes.  ? Chronic kidney disease.  ? Liver disease.  · Are obese.  What are the signs or symptoms?  Symptoms of this condition can range from mild to severe. Symptoms may appear any time from 2 to 14 days after being exposed to the virus. They include:  · A fever.  · A cough.  · Difficulty breathing.  · Chills.  · Muscle pains.  · A sore throat.  · Loss of taste or smell.  Some people may also have stomach problems, such as nausea, vomiting, or diarrhea.  Other people may not have any symptoms of COVID-19.  How is this diagnosed?  This condition may be diagnosed based on:  · Your signs and symptoms, especially if:  ? You live in an area with a COVID-19 outbreak.  ? You recently traveled to or from an area where the virus is common.  ? You provide care for or live with a person who was diagnosed with COVID-19.  · A physical exam.  · Lab tests, which may include:  ? A nasal swab to take a sample of fluid from your nose.  ? A throat swab to take a sample of fluid from your throat.  ? A sample  of mucus from your lungs (sputum).  ? Blood tests.  · Imaging tests, which may include, X-rays, CT scan, or ultrasound.  How is this treated?  At present, there is no medicine to treat COVID-19. Medicines that treat other diseases are being used on a trial basis to see if they are effective against COVID-19.  Your health care provider will talk with you about ways to treat your symptoms. For most people, the infection is mild and can be managed at home with rest, fluids, and over-the-counter medicines.  Treatment for a serious infection usually takes places in a hospital intensive care unit (ICU). It may include one or more of the following treatments. These treatments are given until your symptoms improve.  · Receiving fluids and medicines through an IV.  · Supplemental oxygen. Extra oxygen is given through a tube in the nose, a face mask, or a burgos.  · Positioning you to lie on your stomach (prone position). This makes it easier for oxygen to get into the lungs.  · Continuous positive airway pressure (CPAP) or bi-level positive airway pressure (BPAP) machine. This treatment uses mild air pressure to keep the airways open. A tube that is connected to a motor delivers oxygen to the body.  · Ventilator. This treatment moves air into and out of the lungs by using a tube that is placed in your windpipe.  · Tracheostomy. This is a procedure to create a hole in the neck so that a breathing tube can be inserted.  · Extracorporeal membrane oxygenation (ECMO). This procedure gives the lungs a chance to recover by taking over the functions of the heart and lungs. It supplies oxygen to the body and removes carbon dioxide.  Follow these instructions at home:  Lifestyle  · If you are sick, stay home except to get medical care. Your health care provider will tell you how long to stay home. Call your health care provider before you go for medical care.  · Rest at home as told by your health care provider.  · Do not use any  products that contain nicotine or tobacco, such as cigarettes, e-cigarettes, and chewing tobacco. If you need help quitting, ask your health care provider.  · Return to your normal activities as told by your health care provider. Ask your health care provider what activities are safe for you.  General instructions  · Take over-the-counter and prescription medicines only as told by your health care provider.  · Drink enough fluid to keep your urine pale yellow.  · Keep all follow-up visits as told by your health care provider. This is important.  How is this prevented?    There is no vaccine to help prevent COVID-19 infection. However, there are steps you can take to protect yourself and others from this virus.  To protect yourself:   · Do not travel to areas where COVID-19 is a risk. The areas where COVID-19 is reported change often. To identify high-risk areas and travel restrictions, check the [facility] travel website: wwwnc.cdc.gov/travel/notices  · If you live in, or must travel to, an area where COVID-19 is a risk, take precautions to avoid infection.  ? Stay away from people who are sick.  ? Wash your hands often with soap and water for 20 seconds. If soap and water are not available, use an alcohol-based hand .  ? Avoid touching your mouth, face, eyes, or nose.  ? Avoid going out in public, follow guidance from your state and local health authorities.  ? If you must go out in public, wear a cloth face covering or face mask.  ? Disinfect objects and surfaces that are frequently touched every day. This may include:  § Counters and tables.  § Doorknobs and light switches.  § Sinks and faucets.  § Electronics, such as phones, remote controls, keyboards, computers, and tablets.  To protect others:  If you have symptoms of COVID-19, take steps to prevent the virus from spreading to others.  · If you think you have a COVID-19 infection, contact your health care provider right away. Tell your health care team that  you think you may have a COVID-19 infection.  · Stay home. Leave your house only to seek medical care. Do not use public transport.  · Do not travel while you are sick.  · Wash your hands often with soap and water for 20 seconds. If soap and water are not available, use alcohol-based hand .  · Stay away from other members of your household. Let healthy household members care for children and pets, if possible. If you have to care for children or pets, wash your hands often and wear a mask. If possible, stay in your own room, separate from others. Use a different bathroom.  · Make sure that all people in your household wash their hands well and often.  · Cough or sneeze into a tissue or your sleeve or elbow. Do not cough or sneeze into your hand or into the air.  · Wear a cloth face covering or face mask.  Where to find more information  · Centers for Disease Control and Prevention: www.cdc.gov/coronavirus/2019-ncov/index.html  · World Health Organization: www.who.int/health-topics/coronavirus  Contact a health care provider if:  · You live in or have traveled to an area where COVID-19 is a risk and you have symptoms of the infection.  · You have had contact with someone who has COVID-19 and you have symptoms of the infection.  Get help right away if:  · You have trouble breathing.  · You have pain or pressure in your chest.  · You have confusion.  · You have bluish lips and fingernails.  · You have difficulty waking from sleep.  · You have symptoms that get worse.  These symptoms may represent a serious problem that is an emergency. Do not wait to see if the symptoms will go away. Get medical help right away. Call your local emergency services (911 in the [place] Do not drive yourself to the hospital. Let the emergency medical personnel know if you think you have COVID-19.  Summary  · COVID-19 is a respiratory infection that is caused by a virus. It is also known as coronavirus disease or novel coronavirus.  It can cause serious infections, such as pneumonia, acute respiratory distress syndrome, acute respiratory failure, or sepsis.  · The virus that causes COVID-19 is contagious. This means that it can spread from person to person through droplets from coughs and sneezes.  · You are more likely to develop a serious illness if you are 65 years of age or older, have a weak immunity, live in a nursing home, or have chronic disease.  · There is no medicine to treat COVID-19. Your health care provider will talk with you about ways to treat your symptoms.  · Take steps to protect yourself and others from infection. Wash your hands often and disinfect objects and surfaces that are frequently touched every day. Stay away from people who are sick and wear a mask if you are sick.  This information is not intended to replace advice given to you by your health care provider. Make sure you discuss any questions you have with your health care provider.  Document Released: 01/23/2020 Document Revised: 05/14/2020 Document Reviewed: 01/23/2020  [employer] Patient Education © 2020 [employer]      Depression / Suicide Risk    As you are discharged from this [facility] facility, it is important to learn how to keep safe from harming yourself.    Recognize the warning signs:  · Abrupt changes in personality, positive or negative- including increase in energy   · Giving away possessions  · Change in eating patterns- significant weight changes-  positive or negative  · Change in sleeping patterns- unable to sleep or sleeping all the time   · Unwillingness or inability to communicate  · Depression  · Unusual sadness, discouragement and loneliness  · Talk of wanting to die  · Neglect of personal appearance   · Rebelliousness- reckless behavior  · Withdrawal from people/activities they love  · Confusion- inability to concentrate     If you or a loved one observes any of these behaviors or has concerns about self-harm, here's what you can  do:  · Talk about it- your feelings and reasons for harming yourself  · Remove any means that you might use to hurt yourself (examples: pills, rope, extension cords, firearm)  · Get professional help from the community (Mental Health, Substance Abuse, psychological counseling)  · Do not be alone:Call your Safe Contact- someone whom you trust who will be there for you.  · Call your local CRISIS HOTLINE 425-7343 or 977-786-0909  · Call your local Children's Mobile Crisis Response Team [place] (658) 464-6614 or www.Echo it  · Call the toll free National Suicide Prevention Hotlines   · National Suicide Prevention Lifeline 363-775-TPXW (7035)  · National Hope Line Network 800-SUICIDE (481-6720)

## 2021-10-05 ENCOUNTER — HOSPITAL ENCOUNTER (OUTPATIENT)
Dept: LAB | Facility: MEDICAL CENTER | Age: 41
End: 2021-10-05
Attending: STUDENT IN AN ORGANIZED HEALTH CARE EDUCATION/TRAINING PROGRAM
Payer: COMMERCIAL

## 2021-10-05 LAB
ALBUMIN SERPL BCP-MCNC: 4.3 G/DL (ref 3.2–4.9)
ALBUMIN/GLOB SERPL: 1.4 G/DL
ALP SERPL-CCNC: 63 U/L (ref 30–99)
ALT SERPL-CCNC: 16 U/L (ref 2–50)
ANION GAP SERPL CALC-SCNC: 11 MMOL/L (ref 7–16)
AST SERPL-CCNC: 18 U/L (ref 12–45)
BASOPHILS # BLD AUTO: 0.6 % (ref 0–1.8)
BASOPHILS # BLD: 0.04 K/UL (ref 0–0.12)
BILIRUB SERPL-MCNC: 0.3 MG/DL (ref 0.1–1.5)
BUN SERPL-MCNC: 9 MG/DL (ref 8–22)
CALCIUM SERPL-MCNC: 9.1 MG/DL (ref 8.5–10.5)
CHLORIDE SERPL-SCNC: 107 MMOL/L (ref 96–112)
CHOLEST SERPL-MCNC: 248 MG/DL (ref 100–199)
CO2 SERPL-SCNC: 22 MMOL/L (ref 20–33)
CREAT SERPL-MCNC: 0.69 MG/DL (ref 0.5–1.4)
EOSINOPHIL # BLD AUTO: 0.03 K/UL (ref 0–0.51)
EOSINOPHIL NFR BLD: 0.4 % (ref 0–6.9)
ERYTHROCYTE [DISTWIDTH] IN BLOOD BY AUTOMATED COUNT: 41.7 FL (ref 35.9–50)
EST. AVERAGE GLUCOSE BLD GHB EST-MCNC: 120 MG/DL
FASTING STATUS PATIENT QL REPORTED: NORMAL
FSH SERPL-ACNC: 6.7 MIU/ML
GLOBULIN SER CALC-MCNC: 3.1 G/DL (ref 1.9–3.5)
GLUCOSE SERPL-MCNC: 97 MG/DL (ref 65–99)
HBA1C MFR BLD: 5.8 % (ref 4–5.6)
HCT VFR BLD AUTO: 46.6 % (ref 37–47)
HDLC SERPL-MCNC: 48 MG/DL
HGB BLD-MCNC: 15.7 G/DL (ref 12–16)
IMM GRANULOCYTES # BLD AUTO: 0.06 K/UL (ref 0–0.11)
IMM GRANULOCYTES NFR BLD AUTO: 0.8 % (ref 0–0.9)
LDLC SERPL CALC-MCNC: 157 MG/DL
LH SERPL-ACNC: 5.3 IU/L
LYMPHOCYTES # BLD AUTO: 2.69 K/UL (ref 1–4.8)
LYMPHOCYTES NFR BLD: 38 % (ref 22–41)
MCH RBC QN AUTO: 32.2 PG (ref 27–33)
MCHC RBC AUTO-ENTMCNC: 33.7 G/DL (ref 33.6–35)
MCV RBC AUTO: 95.5 FL (ref 81.4–97.8)
MONOCYTES # BLD AUTO: 0.48 K/UL (ref 0–0.85)
MONOCYTES NFR BLD AUTO: 6.8 % (ref 0–13.4)
NEUTROPHILS # BLD AUTO: 3.78 K/UL (ref 2–7.15)
NEUTROPHILS NFR BLD: 53.4 % (ref 44–72)
NRBC # BLD AUTO: 0 K/UL
NRBC BLD-RTO: 0 /100 WBC
PLATELET # BLD AUTO: 245 K/UL (ref 164–446)
PMV BLD AUTO: 12 FL (ref 9–12.9)
POTASSIUM SERPL-SCNC: 3.9 MMOL/L (ref 3.6–5.5)
PROT SERPL-MCNC: 7.4 G/DL (ref 6–8.2)
RBC # BLD AUTO: 4.88 M/UL (ref 4.2–5.4)
SODIUM SERPL-SCNC: 140 MMOL/L (ref 135–145)
TRIGL SERPL-MCNC: 217 MG/DL (ref 0–149)
TSH SERPL DL<=0.005 MIU/L-ACNC: 2.67 UIU/ML (ref 0.38–5.33)
VALPROATE SERPL-MCNC: 63.9 UG/ML (ref 50–100)
WBC # BLD AUTO: 7.1 K/UL (ref 4.8–10.8)

## 2021-10-05 PROCEDURE — 83002 ASSAY OF GONADOTROPIN (LH): CPT

## 2021-10-05 PROCEDURE — 83001 ASSAY OF GONADOTROPIN (FSH): CPT

## 2021-10-05 PROCEDURE — 80053 COMPREHEN METABOLIC PANEL: CPT

## 2021-10-05 PROCEDURE — 36415 COLL VENOUS BLD VENIPUNCTURE: CPT

## 2021-10-05 PROCEDURE — 83036 HEMOGLOBIN GLYCOSYLATED A1C: CPT

## 2021-10-05 PROCEDURE — 84443 ASSAY THYROID STIM HORMONE: CPT

## 2021-10-05 PROCEDURE — 80061 LIPID PANEL: CPT

## 2021-10-05 PROCEDURE — 80164 ASSAY DIPROPYLACETIC ACD TOT: CPT

## 2021-10-05 PROCEDURE — 85025 COMPLETE CBC W/AUTO DIFF WBC: CPT

## 2021-10-22 NOTE — ED NOTES
Rounded on pt. POC reviewed. Pt resting comfortably. Pt medicated per ERP orders   Thais is here presenting for: Post Partum visit:     Thais delivered a girl,named Ty on 21 via  section.     Patient is Breastfeeding. She desires Mirena IUD for contraception.    Last pap smear was 2020, normal   History of abnormal pap smears: none    Denies Latex allergy or sensitivity.    Medication verified, no changes    Over the last 2 weeks, how often have you been bothered by the following problems?          PHQ2 Score: 0  PHQ2 Score Interpretation: No further screening needed  1. Little interest or pleasure in activity?: 0  2. Feeling down, depressed, or hopeless?: 0

## 2022-01-22 ENCOUNTER — HOSPITAL ENCOUNTER (OUTPATIENT)
Facility: MEDICAL CENTER | Age: 42
End: 2022-01-22
Attending: NURSE PRACTITIONER
Payer: COMMERCIAL

## 2022-01-22 ENCOUNTER — OFFICE VISIT (OUTPATIENT)
Dept: URGENT CARE | Facility: CLINIC | Age: 42
End: 2022-01-22
Payer: COMMERCIAL

## 2022-01-22 VITALS
HEART RATE: 95 BPM | OXYGEN SATURATION: 95 % | SYSTOLIC BLOOD PRESSURE: 128 MMHG | TEMPERATURE: 97.2 F | WEIGHT: 250 LBS | HEIGHT: 69 IN | DIASTOLIC BLOOD PRESSURE: 78 MMHG | BODY MASS INDEX: 37.03 KG/M2 | RESPIRATION RATE: 20 BRPM

## 2022-01-22 DIAGNOSIS — J98.8 RTI (RESPIRATORY TRACT INFECTION): ICD-10-CM

## 2022-01-22 DIAGNOSIS — J02.9 PHARYNGITIS, UNSPECIFIED ETIOLOGY: ICD-10-CM

## 2022-01-22 PROCEDURE — U0003 INFECTIOUS AGENT DETECTION BY NUCLEIC ACID (DNA OR RNA); SEVERE ACUTE RESPIRATORY SYNDROME CORONAVIRUS 2 (SARS-COV-2) (CORONAVIRUS DISEASE [COVID-19]), AMPLIFIED PROBE TECHNIQUE, MAKING USE OF HIGH THROUGHPUT TECHNOLOGIES AS DESCRIBED BY CMS-2020-01-R: HCPCS

## 2022-01-22 PROCEDURE — U0005 INFEC AGEN DETEC AMPLI PROBE: HCPCS

## 2022-01-22 PROCEDURE — 99203 OFFICE O/P NEW LOW 30 MIN: CPT | Performed by: NURSE PRACTITIONER

## 2022-01-22 RX ORDER — DULOXETIN HYDROCHLORIDE 60 MG/1
CAPSULE, DELAYED RELEASE ORAL
COMMUNITY
Start: 2022-01-11

## 2022-01-22 RX ORDER — DIVALPROEX SODIUM 125 MG/1
CAPSULE, COATED PELLETS ORAL
COMMUNITY
Start: 2022-01-11

## 2022-01-22 RX ORDER — RISPERIDONE 0.5 MG/1
TABLET ORAL
COMMUNITY
Start: 2022-01-11

## 2022-01-22 ASSESSMENT — ENCOUNTER SYMPTOMS
COUGH: 1
NAUSEA: 0
SHORTNESS OF BREATH: 0
WHEEZING: 0
FEVER: 0
HEADACHES: 1
MYALGIAS: 0
VOMITING: 0
DIZZINESS: 0
RHINORRHEA: 1
SORE THROAT: 0
CHILLS: 0
EYE PAIN: 0

## 2022-01-22 ASSESSMENT — FIBROSIS 4 INDEX: FIB4 SCORE: .7714285714285714286

## 2022-01-23 DIAGNOSIS — J02.9 PHARYNGITIS, UNSPECIFIED ETIOLOGY: ICD-10-CM

## 2022-01-23 LAB — COVID ORDER STATUS COVID19: NORMAL

## 2022-01-23 NOTE — PROGRESS NOTES
Subjective:   Lidia Salcedo is a 41 y.o. female who presents for Nasal Congestion (cough/runny nose/[id number])      URI   This is a new problem. The current episode started in the past 7 days (expposed covid). The problem has been unchanged. There has been no fever. Associated symptoms include congestion, coughing, headaches and rhinorrhea. Pertinent negatives include no chest pain, ear pain, nausea, plugged ear sensation, rash, sore throat, vomiting or wheezing. She has tried acetaminophen for the symptoms. The treatment provided no relief.       Review of Systems   Constitutional: Positive for malaise/fatigue. Negative for chills and fever.   HENT: Positive for congestion and rhinorrhea. Negative for ear pain and sore throat.    Eyes: Negative for pain.   Respiratory: Positive for cough. Negative for shortness of breath and wheezing.    Cardiovascular: Negative for chest pain.   Gastrointestinal: Negative for nausea and vomiting.   Genitourinary: Negative for hematuria.   Musculoskeletal: Negative for myalgias.   Skin: Negative for rash.   Neurological: Positive for headaches. Negative for dizziness.       Medications:    • ascorbic acid Tabs  • CEROVITE PO  • citalopram Tabs  • clonazePAM Tabs  • DIVALPROEX SODIUM ER PO  • DULOXETINE HCL PO  • ketoconazole  • lithium carbonate ER Tbcr  • norgestrel-ethinyl estradiol Tabs  • oxyCODONE-acetaminophen  • vitamin D Tabs  • zinc sulfate Caps    Allergies: Nkda [no known drug allergy]    Problem List: Lidia Salcedo does not have any pertinent problems on file.    Surgical History:  Past Surgical History:   Procedure Laterality Date   • HYSTERECTOMY ROBOTIC XI  2/12/2015    Procedure: RIGHT SALPINGECTOMY;  Surgeon: Donald Damon M.D.;  Location: SURGERY [facility];  Service:    • OTHER      cyst removed, ovary removed       Past Social Hx: Lidia Salcedo  reports that she has never smoked. She has never used smokeless tobacco. She reports that she  "does not drink alcohol and does not use drugs.     Past Family Hx:  Lidia Salcedo family history is not on file.     Problem list, medications, and allergies reviewed by myself today in Epic.     Objective:     /78 (BP Location: Right arm, Patient Position: Sitting)   Pulse 95   Temp 36.2 °C (97.2 °F) (Temporal)   Resp 20   Ht 1.755 m (5' 9.09\")   Wt 113 kg (250 lb)   SpO2 95%   BMI 36.82 kg/m²     Physical Exam  Vitals and nursing note reviewed.   Constitutional:       General: She is not in acute distress.     Appearance: She is well-developed.   HENT:      Head: Normocephalic and atraumatic.      Right Ear: Tympanic membrane and external ear normal.      Left Ear: Tympanic membrane and external ear normal.      Nose: Nose normal.      Right Sinus: No maxillary sinus tenderness or frontal sinus tenderness.      Left Sinus: No maxillary sinus tenderness or frontal sinus tenderness.      Mouth/Throat:      Mouth: Mucous membranes are moist.      Pharynx: Uvula midline. No posterior oropharyngeal erythema.      Tonsils: No tonsillar exudate or tonsillar abscesses.   Eyes:      General:         Right eye: No discharge.         Left eye: No discharge.      Conjunctiva/sclera: Conjunctivae normal.   Cardiovascular:      Rate and Rhythm: Normal rate.   Pulmonary:      Effort: Pulmonary effort is normal. No respiratory distress.      Breath sounds: Normal breath sounds.   Abdominal:      General: There is no distension.   Musculoskeletal:         General: Normal range of motion.   Skin:     General: Skin is warm and dry.   Neurological:      General: No focal deficit present.      Mental Status: She is alert and oriented to person, place, and time. Mental status is at baseline.      Gait: Gait (gait at baseline) normal.   Psychiatric:         Judgment: Judgment normal.         Assessment/Plan:     Diagnosis and associated orders:     1. Pharyngitis, unspecified etiology  SARS-CoV-2 PCR (24 hour " In-House): Collect NP swab in VTM   2. RTI (respiratory tract infection)        Comments/MDM:     The patient's presenting symptoms and exam findings most likely are due to a viral etiology.     Test for COVID-19 via PCR. Result will be reviewed by myself. We will call/message back for results and appropriate further instructions. Instructed to sign up for [employer] if they have not already. Result will be automatically released to [employer] application for patient review. I will be sending a message with Next Step Instructions to [employer] soon after resulted.   Symptomatic and supportive care:   Plenty of oral hydration and rest   Over the counter cough suppressant as directed.  Tylenol or ibuprofen for pain and fever as directed.   Warm salt water gargles for sore throat, soft foods, cool liquids.   Saline nasal spray and Flonase as a decongestant.   Infection control measures at home. Stay away from people, Hand washing, covering sneeze/cough, disinfect surfaces.   Remain home from work, school, and other populated environments. Work note provided with information of quarantine measures per CDC guidelines.   Overall, the patient is well-appearing. They are not hypoxic, afebrile, and a normal pulmonary exam.      •              Please note that this dictation was created using voice recognition software. I have made a reasonable attempt to correct obvious errors, but I expect that there are errors of grammar and possibly content that I did not discover before finalizing the note.    This note was electronically signed by Husam MONSALVE.

## 2022-01-24 LAB
SARS-COV-2 RNA RESP QL NAA+PROBE: DETECTED
SPECIMEN SOURCE: ABNORMAL

## 2022-02-01 ENCOUNTER — TELEPHONE (OUTPATIENT)
Dept: URGENT CARE | Facility: CLINIC | Age: 42
End: 2022-02-01

## 2022-04-07 ENCOUNTER — OFFICE VISIT (OUTPATIENT)
Dept: MEDICAL GROUP | Facility: CLINIC | Age: 42
End: 2022-04-07
Payer: COMMERCIAL

## 2022-04-07 VITALS
WEIGHT: 262 LBS | DIASTOLIC BLOOD PRESSURE: 86 MMHG | HEART RATE: 103 BPM | HEIGHT: 68 IN | BODY MASS INDEX: 39.71 KG/M2 | SYSTOLIC BLOOD PRESSURE: 123 MMHG | OXYGEN SATURATION: 92 %

## 2022-04-07 DIAGNOSIS — R53.83 FATIGUE, UNSPECIFIED TYPE: ICD-10-CM

## 2022-04-07 DIAGNOSIS — M25.562 CHRONIC PAIN OF LEFT KNEE: ICD-10-CM

## 2022-04-07 DIAGNOSIS — G89.29 CHRONIC PAIN OF LEFT KNEE: ICD-10-CM

## 2022-04-07 DIAGNOSIS — E66.9 OBESITY (BMI 30-39.9): ICD-10-CM

## 2022-04-07 DIAGNOSIS — N39.41 URGE INCONTINENCE OF URINE: ICD-10-CM

## 2022-04-07 PROBLEM — R32 URINARY INCONTINENCE: Status: ACTIVE | Noted: 2022-04-07

## 2022-04-07 PROCEDURE — 99213 OFFICE O/P EST LOW 20 MIN: CPT | Mod: GE | Performed by: STUDENT IN AN ORGANIZED HEALTH CARE EDUCATION/TRAINING PROGRAM

## 2022-04-07 RX ORDER — CITALOPRAM 40 MG/1
TABLET ORAL
COMMUNITY
Start: 2022-03-15 | End: 2022-07-25

## 2022-04-07 RX ORDER — OXYBUTYNIN CHLORIDE 5 MG/1
5 TABLET, EXTENDED RELEASE ORAL DAILY
Qty: 30 TABLET | Refills: 3 | Status: SHIPPED | OUTPATIENT
Start: 2022-04-07 | End: 2023-06-06 | Stop reason: SDUPTHER

## 2022-04-07 ASSESSMENT — FIBROSIS 4 INDEX: FIB4 SCORE: .7714285714285714286

## 2022-04-07 NOTE — ASSESSMENT & PLAN NOTE
Patient describes global fatigue.  States that she occasionally naps during the daytime.  States that she sleeps all the night.  Patient likely going through menopause and denies any heavy menstruation.  Stop bang score of 3  Plan:  Follow-up on labs including CBC, CMP, TSH  Refer patient for sleep study

## 2022-04-07 NOTE — ASSESSMENT & PLAN NOTE
Chronic.  History sounds like urge incontinence.  Unsure etiology at this time given that patient cannot really provide her own history.  Other differentials include stress incontinence, UTI, or mixed incontinence.  Plan:  Follow-up on urinalysis results and if UTI treat appropriately  Start oxybutynin 5mg daily   Follow up in 1 month

## 2022-04-07 NOTE — PROGRESS NOTES
[facility]    Chief Complaint   Patient presents with   • Annual Exam     Referral to OBGYN, knee hurts        HISTORY OF PRESENT ILLNESS: Patient is a 41 y.o. female established patient who presents today to discuss the medical issues below.    Problem   Urinary Incontinence    Care giver states that she urinates her self during the day and at night. Does it frequently. Prompting patient to go to the bathroom every hour. Patient can describe when she has to go to the bathroom. Patient wears depends when she goes out and fills the diapers. Urinates every hour. No bloody urine. Had hysterectomy 2 years prior.      Fatigue    Patient is always tired. Falls asleep during the day. Can sleep throughout the night. No real history if she snores. No headaches in the morning.      Left Knee Pain    No history of trauma to the knee. Knee hurts all the time. Worse on ambulation. Patient can only walk a few steps before having to stop. Worse in the morning.        Patient denied need for refills at this time.  Most of history was provided by  who is also present in the room.   states that she is currently stable on all of her medication.  Filled out the paperwork that was provided to me.      Patient Active Problem List    Diagnosis Date Noted   • Urinary incontinence 04/07/2022   • Fatigue 04/07/2022   • Left knee pain 04/07/2022   • Acute respiratory failure with hypoxia ([facility]) 12/19/2020   • COVID-19 12/19/2020   • Mental developmental delay    • Bacteriuria with pyuria    • Psychiatric problem    • Morbid obesity with body mass index of 40.0-44.9 in adult ([facility])    • Pelvic mass 02/12/2015       Allergies:Nkda [no known drug allergy]    Current Outpatient Medications   Medication Sig Dispense Refill   • citalopram (CELEXA) 40 MG Tab      • oxybutynin SR (DITROPAN-XL) 5 MG TABLET SR 24 HR Take 1 Tablet by mouth every day. 30 Tablet 3   • risperiDONE (RISPERDAL) 0.5 MG Tab      • DULoxetine  "(CYMBALTA) 60 MG Cap DR Particles delayed-release capsule      • Multiple Vitamins-Minerals (CEROVITE PO) Take 1 Tab by mouth every morning.     • ketoconazole (NIZORAL) 2 % shampoo Apply  to affected area(s) 1 time daily as needed.     • divalproex (DEPAKOTE SPRINKLE) 125 MG Capsule Delayed Release Sprinkle  (Patient not taking: Reported on 4/7/2022)     • vitamin D (VITAMIND D3) 1000 UNIT Tab Take 1 Tab by mouth every day. (Patient not taking: Reported on 4/7/2022) 60 Tab 0   • zinc sulfate (ZINCATE) 220 (50 Zn) MG Cap Take 1 Cap by mouth every day. (Patient not taking: Reported on 4/7/2022) 30 Cap 3   • DIVALPROEX SODIUM ER PO Take  by mouth. (Patient not taking: Reported on 4/7/2022)     • oxycodone-acetaminophen (PERCOCET) 7.5-325 MG per tablet Take 1-2 Tabs by mouth every 6 hours as needed (pain). (Patient not taking: No sig reported) 60 Tab 0   • clonazepam (KLONOPIN) 0.5 MG TABS Take 0.25 mg by mouth every bedtime. (Patient not taking: Reported on 4/7/2022)     • norgestrel-ethinyl estradiol (LO-OVRAL) 0.3-30 MG-MCG Tab Take 1 Tab by mouth every morning. (Patient not taking: Reported on 4/7/2022)     • ascorbic acid (ASCORBIC ACID) 500 MG TABS Take 500 mg by mouth every morning. (Patient not taking: Reported on 4/7/2022)     • lithium CR (ESKALITH CR) 450 MG TBCR Take 450 mg by mouth 2 times a day. (Patient not taking: Reported on 4/7/2022)       No current facility-administered medications for this visit.         Past Medical History:   Diagnosis Date   • Mental developmental delay     pt mentally \"6 years old\"   • Other specified symptom associated with female genital organs    • Unspecified urinary incontinence        Social History     Tobacco Use   • Smoking status: Never Smoker   • Smokeless tobacco: Never Used   Vaping Use   • Vaping Use: Never used   Substance Use Topics   • Alcohol use: No   • Drug use: No       No family status information on file.   No family history on file.    ROS:  Negative " "except as stated above.       Exam:    /86   Pulse (!) 103   Ht 1.727 m (5' 8\")   Wt 119 kg (262 lb)   SpO2 92%  Body mass index is 39.84 kg/m².  General:  Well nourished, well developed female in NAD.  HENT: Normocephalic, bilateral TMs are intact, nasal and oral mucosa with no lesions,   Neck: Supple without bruit. Thyroid is not enlarged, no nodules palpated.  Pulmonary: Clear to ausculation.  Normal effort. No rales, rhonchi, or wheezing.  Cardiovascular: Regular rate and rhythm without murmur.   Abdomen: Normal bowel sounds, soft and nontender, no palpable liver, spleen, or masses.  Extremities: No LE edema noted. 5/5 strength in all extremities  Neuro: Grossly nonfocal.  Psych: Alert and oriented to person, place, and time. Appropriate mood and conversation.              Assessment/Plan:    1. Urge incontinence of urine  URINALYSIS,CULTURE IF INDICATED    oxybutynin SR (DITROPAN-XL) 5 MG TABLET SR 24 HR   2. Fatigue, unspecified type  Comp Metabolic Panel    TSH WITH REFLEX TO FT4    CBC WITH DIFFERENTIAL    Referral to Pulmonary and Sleep Medicine   3. Chronic pain of left knee     4. Obesity (BMI 30-39.9)  Comp Metabolic Panel    Lipid Profile    HEMOGLOBIN A1C       Orders Placed This Encounter   • Comp Metabolic Panel   • Lipid Profile   • TSH WITH REFLEX TO FT4   • CBC WITH DIFFERENTIAL   • URINALYSIS,CULTURE IF INDICATED   • HEMOGLOBIN A1C   • Referral to Pulmonary and Sleep Medicine   • citalopram (CELEXA) 40 MG Tab   • oxybutynin SR (DITROPAN-XL) 5 MG TABLET SR 24 HR       Urinary incontinence  Chronic.  History sounds like urge incontinence.  Unsure etiology at this time given that patient cannot really provide her own history.  Other differentials include stress incontinence, UTI, or mixed incontinence.  Plan:  Follow-up on urinalysis results and if UTI treat appropriately  Start oxybutynin 5mg daily   Follow up in 1 month      Fatigue  Patient describes global fatigue.  States that she " occasionally naps during the daytime.  States that she sleeps all the night.  Patient likely going through menopause and denies any heavy menstruation.  Stop bang score of 3  Plan:  Follow-up on labs including CBC, CMP, TSH  Refer patient for sleep study    Left knee pain  Likely secondary to osteoarthritis.  Plan:  Refer patient to physical therapy  Patient going to order a knee sleeve for comfort  Patient can use Tylenol as needed for pain      Followup: Return in about 1 month (around 5/7/2022).     Peng Kenyon MD   [facility]  PGY-2

## 2022-04-07 NOTE — ASSESSMENT & PLAN NOTE
Likely secondary to osteoarthritis.  Plan:  Refer patient to physical therapy  Patient going to order a knee sleeve for comfort  Patient can use Tylenol as needed for pain

## 2022-04-15 RX ORDER — ACETAMINOPHEN 500 MG
500-1000 TABLET ORAL EVERY 6 HOURS PRN
COMMUNITY
End: 2022-04-15 | Stop reason: SDUPTHER

## 2022-04-16 RX ORDER — ACETAMINOPHEN 500 MG
500-1000 TABLET ORAL EVERY 6 HOURS PRN
Qty: 90 TABLET | Refills: 3 | Status: SHIPPED | OUTPATIENT
Start: 2022-04-16 | End: 2023-05-03

## 2022-05-09 NOTE — TELEPHONE ENCOUNTER
VOIReceived request via: Pharmacy    Was the patient seen in the last year in this department? Yes    Does the patient have an active prescription (recently filled or refills available) for medication(s) requested? No

## 2022-05-10 RX ORDER — KETOCONAZOLE 20 MG/ML
SHAMPOO TOPICAL
Qty: 100 ML | Refills: 0 | Status: SHIPPED | OUTPATIENT
Start: 2022-05-10 | End: 2022-07-14

## 2022-05-12 ENCOUNTER — OFFICE VISIT (OUTPATIENT)
Dept: MEDICAL GROUP | Facility: CLINIC | Age: 42
End: 2022-05-12
Payer: COMMERCIAL

## 2022-05-12 VITALS
HEART RATE: 101 BPM | BODY MASS INDEX: 39.86 KG/M2 | RESPIRATION RATE: 20 BRPM | HEIGHT: 68 IN | WEIGHT: 263 LBS | SYSTOLIC BLOOD PRESSURE: 125 MMHG | TEMPERATURE: 97.8 F | OXYGEN SATURATION: 93 % | DIASTOLIC BLOOD PRESSURE: 84 MMHG

## 2022-05-12 DIAGNOSIS — M25.562 CHRONIC PAIN OF LEFT KNEE: ICD-10-CM

## 2022-05-12 DIAGNOSIS — E78.5 HYPERLIPIDEMIA, UNSPECIFIED HYPERLIPIDEMIA TYPE: ICD-10-CM

## 2022-05-12 DIAGNOSIS — R73.03 PREDIABETES: ICD-10-CM

## 2022-05-12 DIAGNOSIS — Z12.31 ENCOUNTER FOR SCREENING MAMMOGRAM FOR BREAST CANCER: ICD-10-CM

## 2022-05-12 DIAGNOSIS — G89.29 CHRONIC PAIN OF LEFT KNEE: ICD-10-CM

## 2022-05-12 DIAGNOSIS — R31.29 OTHER MICROSCOPIC HEMATURIA: ICD-10-CM

## 2022-05-12 PROCEDURE — 99213 OFFICE O/P EST LOW 20 MIN: CPT | Mod: GE | Performed by: STUDENT IN AN ORGANIZED HEALTH CARE EDUCATION/TRAINING PROGRAM

## 2022-05-12 RX ORDER — ATORVASTATIN CALCIUM 20 MG/1
20 TABLET, FILM COATED ORAL NIGHTLY
Qty: 30 TABLET | Refills: 5 | Status: SHIPPED | OUTPATIENT
Start: 2022-05-12 | End: 2022-05-23 | Stop reason: SDUPTHER

## 2022-05-12 ASSESSMENT — FIBROSIS 4 INDEX: FIB4 SCORE: 0.98

## 2022-05-12 NOTE — PROGRESS NOTES
[facility]    Chief Complaint   Patient presents with   • Follow-Up     Review labs       HISTORY OF PRESENT ILLNESS: Patient is a 41 y.o. female established patient who presents today to discuss the medical issues below.    Problem   Other Microscopic Hematuria   Hyperlipidemia   Prediabetes   Left Knee Pain    No history of trauma to the knee. Knee hurts all the time. Worse on ambulation. Patient can only walk a few steps before having to stop. Worse in the morning.          Patient Active Problem List    Diagnosis Date Noted   • Other microscopic hematuria 05/12/2022   • Hyperlipidemia 05/12/2022   • Prediabetes 05/12/2022   • Urinary incontinence 04/07/2022   • Fatigue 04/07/2022   • Left knee pain 04/07/2022   • Acute respiratory failure with hypoxia ([facility]) 12/19/2020   • COVID-19 12/19/2020   • Mental developmental delay    • Bacteriuria with pyuria    • Psychiatric problem    • Morbid obesity with body mass index of 40.0-44.9 in adult ([facility])    • Pelvic mass 02/12/2015       Allergies:Nkda [no known drug allergy]    Current Outpatient Medications   Medication Sig Dispense Refill   • metFORMIN (GLUCOPHAGE) 500 MG Tab Take 1 Tablet by mouth 2 times a day with meals. 60 Tablet 5   • atorvastatin (LIPITOR) 20 MG Tab Take 1 Tablet by mouth every evening. 30 Tablet 5   • ketoconazole (NIZORAL) 2 % shampoo Apply shampoo to scalp once daily 100 mL 0   • acetaminophen (TYLENOL) 500 MG Tab Take 1-2 Tablets by mouth every 6 hours as needed for up to 90 days. 90 Tablet 3   • citalopram (CELEXA) 40 MG Tab      • oxybutynin SR (DITROPAN-XL) 5 MG TABLET SR 24 HR Take 1 Tablet by mouth every day. 30 Tablet 3   • risperiDONE (RISPERDAL) 0.5 MG Tab      • DULoxetine (CYMBALTA) 60 MG Cap DR Particles delayed-release capsule      • divalproex (DEPAKOTE SPRINKLE) 125 MG Capsule Delayed Release Sprinkle  (Patient not taking: Reported on 4/7/2022)     • vitamin D (VITAMIND D3) 1000 UNIT Tab Take 1 Tab by mouth every day.  "(Patient not taking: Reported on 4/7/2022) 60 Tab 0   • zinc sulfate (ZINCATE) 220 (50 Zn) MG Cap Take 1 Cap by mouth every day. (Patient not taking: Reported on 4/7/2022) 30 Cap 3   • DIVALPROEX SODIUM ER PO Take  by mouth. (Patient not taking: Reported on 4/7/2022)     • oxycodone-acetaminophen (PERCOCET) 7.5-325 MG per tablet Take 1-2 Tabs by mouth every 6 hours as needed (pain). (Patient not taking: No sig reported) 60 Tab 0   • clonazepam (KLONOPIN) 0.5 MG TABS Take 0.25 mg by mouth every bedtime. (Patient not taking: Reported on 4/7/2022)     • norgestrel-ethinyl estradiol (LO-OVRAL) 0.3-30 MG-MCG Tab Take 1 Tab by mouth every morning. (Patient not taking: Reported on 4/7/2022)     • Multiple Vitamins-Minerals (CEROVITE PO) Take 1 Tab by mouth every morning.     • ascorbic acid (ASCORBIC ACID) 500 MG TABS Take 500 mg by mouth every morning. (Patient not taking: Reported on 4/7/2022)     • lithium CR (ESKALITH CR) 450 MG TBCR Take 450 mg by mouth 2 times a day. (Patient not taking: Reported on 4/7/2022)       No current facility-administered medications for this visit.         Past Medical History:   Diagnosis Date   • Mental developmental delay     pt mentally \"6 years old\"   • Other specified symptom associated with female genital organs    • Unspecified urinary incontinence        Social History     Tobacco Use   • Smoking status: Never Smoker   • Smokeless tobacco: Never Used   Vaping Use   • Vaping Use: Never used   Substance Use Topics   • Alcohol use: No   • Drug use: No       No family status information on file.   History reviewed. No pertinent family history.    ROS:  Negative except as stated above.       Exam:    /84   Pulse (!) 101   Temp 36.6 °C (97.8 °F) (Temporal)   Resp 20   Ht 1.727 m (5' 8\")   Wt 119 kg (263 lb)   SpO2 93%  Body mass index is 39.99 kg/m².  General:  Well nourished, well developed female in NAD.  HENT: Normocephalic, bilateral TMs are intact, nasal and oral mucosa " with no lesions,   Neck: Supple without bruit. Thyroid is not enlarged, no nodules palpated.  Pulmonary: Clear to ausculation.  Normal effort. No rales, rhonchi, or wheezing.  Cardiovascular: Regular rate and rhythm without murmur.   Abdomen: Normal bowel sounds, soft and nontender, no palpable liver, spleen, or masses.  Extremities: No LE edema noted. 5/5 strength in all extremities  Neuro: Grossly nonfocal.  Psych: Alert and oriented to person, place, and time. Appropriate mood and conversation.              Assessment/Plan:    1. Prediabetes  metFORMIN (GLUCOPHAGE) 500 MG Tab   2. Hyperlipidemia, unspecified hyperlipidemia type  atorvastatin (LIPITOR) 20 MG Tab   3. Other microscopic hematuria  Referral to Urology   4. Chronic pain of left knee     5. Encounter for screening mammogram for breast cancer  MA-SCREENING MAMMO BILAT W/TOMOSYNTHESIS W/CAD       Orders Placed This Encounter   • MA-SCREENING MAMMO BILAT W/TOMOSYNTHESIS W/CAD   • Referral to Urology   • metFORMIN (GLUCOPHAGE) 500 MG Tab   • atorvastatin (LIPITOR) 20 MG Tab       Prediabetes  Patient noted to have an A1c of 6 on recent labs.  Meets criteria for diagnosis of prediabetes.  Had discussion with patient and caregiver on whether to start a medication.  Patient given her developmental delay, she is not amenable to dietary and exercise counseling.  It was advised per the group home coordinator that she does not really have control of the patient's diet.  She can encourage the patient to exercise more including walking around her neighborhood.  Plan:  Start metformin 500 twice daily  Continue encourage healthy diet and consistent exercise    Hyperlipidemia  Patient found to have hyperlipidemia on recent screening labs.  She is also obese with a BMI of 39.  With shared decision making between the patient, physician, and caregiver it was decided to start the patient on a statin.  Plan:  Start Lipitor 20  Continue to monitor    Other microscopic  hematuria  Patient was noted to have persistent hematuria over the past 2 and urinalysis results spanding 2 years.  She does have what seems to be urge incontinence associated with this hematuria.  Denies any dysuria or increased frequency.  This urge incontinence has improved since starting oxybutynin.  Plan:  Continue oxybutynin  Refer to urology    Left knee pain  Patient has history of left knee pain.  Has been a chronic issue.  No concerning signs on exam.  No tenderness to palpation of the bony process.  Plan:  Encourage patient to wear a knee brace during exercise  Told patient and caregiver that they could purchase an affordable brace on [employer]      Followup: Return in about 3 months (around 8/12/2022).     Peng Kenyon MD   [facility]   PGY-2

## 2022-05-13 NOTE — ASSESSMENT & PLAN NOTE
Patient was noted to have persistent hematuria over the past 2 and urinalysis results spanding 2 years.  She does have what seems to be urge incontinence associated with this hematuria.  Denies any dysuria or increased frequency.  This urge incontinence has improved since starting oxybutynin.  Plan:  Continue oxybutynin  Refer to urology

## 2022-05-13 NOTE — ASSESSMENT & PLAN NOTE
Patient has history of left knee pain.  Has been a chronic issue.  No concerning signs on exam.  No tenderness to palpation of the bony process.  Plan:  Encourage patient to wear a knee brace during exercise  Told patient and caregiver that they could purchase an affordable brace on [employer]

## 2022-05-13 NOTE — ASSESSMENT & PLAN NOTE
Patient found to have hyperlipidemia on recent screening labs.  She is also obese with a BMI of 39.  With shared decision making between the patient, physician, and caregiver it was decided to start the patient on a statin.  Plan:  Start Lipitor 20  Continue to monitor

## 2022-05-13 NOTE — ASSESSMENT & PLAN NOTE
Patient noted to have an A1c of 6 on recent labs.  Meets criteria for diagnosis of prediabetes.  Had discussion with patient and caregiver on whether to start a medication.  Patient given her developmental delay, she is not amenable to dietary and exercise counseling.  It was advised per the group home coordinator that she does not really have control of the patient's diet.  She can encourage the patient to exercise more including walking around her neighborhood.  Plan:  Start metformin 500 twice daily  Continue encourage healthy diet and consistent exercise

## 2022-06-27 ENCOUNTER — OFFICE VISIT (OUTPATIENT)
Dept: URGENT CARE | Facility: PHYSICIAN GROUP | Age: 42
End: 2022-06-27
Payer: COMMERCIAL

## 2022-06-27 VITALS
DIASTOLIC BLOOD PRESSURE: 82 MMHG | RESPIRATION RATE: 16 BRPM | TEMPERATURE: 97.9 F | OXYGEN SATURATION: 92 % | SYSTOLIC BLOOD PRESSURE: 128 MMHG | HEART RATE: 90 BPM | WEIGHT: 262 LBS | BODY MASS INDEX: 39.84 KG/M2

## 2022-06-27 DIAGNOSIS — B35.4 TINEA CORPORIS: ICD-10-CM

## 2022-06-27 PROCEDURE — 99214 OFFICE O/P EST MOD 30 MIN: CPT | Performed by: PHYSICIAN ASSISTANT

## 2022-06-27 RX ORDER — KETOCONAZOLE 20 MG/G
1 CREAM TOPICAL
Qty: 30 G | Refills: 0 | Status: SHIPPED | OUTPATIENT
Start: 2022-06-27 | End: 2022-07-14

## 2022-06-27 ASSESSMENT — FIBROSIS 4 INDEX: FIB4 SCORE: 1.01

## 2022-06-27 NOTE — LETTER
[facility] [address] [id number]     June 27, 2022    Patient: Lidia Salcedo   YOB: 1980   Date of Visit: 6/27/2022       To Whom It May Concern:    Lidia Salcedo was seen and treated in our department on 6/27/2022.  The rash on the right upper thigh does appear to be a fungal infection, we will trial a topical antifungal at this time. Continue adequate cleaning and drying of the affected area. Return to urgent care if symptoms do not improve/worsen.    Sincerely,     Antony Ordonez P.A.-C.

## 2022-06-28 PROBLEM — F39 MOOD DISORDER (HCC): Status: ACTIVE | Noted: 2020-01-28

## 2022-06-28 PROBLEM — Z90.710 HX OF HYSTERECTOMY: Status: ACTIVE | Noted: 2018-11-07

## 2022-06-28 PROBLEM — N39.0 URINARY TRACT INFECTION, ACUTE: Status: ACTIVE | Noted: 2019-08-07

## 2022-06-28 PROBLEM — H61.21 IMPACTED CERUMEN OF RIGHT EAR: Status: ACTIVE | Noted: 2020-01-28

## 2022-06-28 PROBLEM — E66.9 OBESITY: Status: ACTIVE | Noted: 2020-01-28

## 2022-06-28 PROBLEM — M25.562 PAIN IN LEFT KNEE: Status: ACTIVE | Noted: 2018-05-09

## 2022-06-28 PROBLEM — L03.90 CELLULITIS: Status: ACTIVE | Noted: 2021-06-10

## 2022-06-28 PROBLEM — R73.03 PREDIABETES: Status: ACTIVE | Noted: 2020-02-13

## 2022-06-28 PROBLEM — R03.0 ELEVATED BLOOD PRESSURE READING WITHOUT DIAGNOSIS OF HYPERTENSION: Status: ACTIVE | Noted: 2020-01-28

## 2022-06-28 PROBLEM — E66.9 OBESITY WITH BODY MASS INDEX 30 OR GREATER: Status: ACTIVE | Noted: 2021-04-29

## 2022-06-28 PROBLEM — F71 MODERATE INTELLECTUAL DISABILITY: Status: ACTIVE | Noted: 2022-06-28

## 2022-06-28 PROBLEM — H91.91 UNSPECIFIED HEARING LOSS, RIGHT EAR: Status: ACTIVE | Noted: 2020-01-28

## 2022-06-28 PROBLEM — D25.9 UTERINE LEIOMYOMA: Status: ACTIVE | Noted: 2022-06-28

## 2022-06-28 PROBLEM — R32 URINARY INCONTINENCE: Status: ACTIVE | Noted: 2017-05-03

## 2022-06-28 ASSESSMENT — ENCOUNTER SYMPTOMS
CARDIOVASCULAR NEGATIVE: 1
NEUROLOGICAL NEGATIVE: 1
CONSTITUTIONAL NEGATIVE: 1
GASTROINTESTINAL NEGATIVE: 1
RESPIRATORY NEGATIVE: 1

## 2022-06-28 NOTE — PROGRESS NOTES
"  Subjective:     Lidia Salcedo  is a 42 y.o. female who presents for Rash (Right side upper thigh. Possibly due to lack of changing. Onset 2 days)       She presents, with an aide, for a right upper thigh rash that has been ongoing for the last 2 days.  She is experiencing pruritus and some mild discomfort over the affected area.  No trauma to the area, no active bleeding, no active drainage/discharge.  She does have known episodes of incontinence.  She also has known mental delay.       Review of Systems   Constitutional: Negative.    Respiratory: Negative.    Cardiovascular: Negative.    Gastrointestinal: Negative.    Genitourinary: Negative.    Skin: Positive for itching and rash.   Neurological: Negative.       Allergies   Allergen Reactions   • Nkda [No Known Drug Allergy]      Past Medical History:   Diagnosis Date   • Mental developmental delay     pt mentally \"6 years old\"   • Other specified symptom associated with female genital organs    • Unspecified urinary incontinence         Objective:   /82 (BP Location: Left arm, Patient Position: Sitting, BP Cuff Size: Large adult)   Pulse 90   Temp 36.6 °C (97.9 °F) (Temporal)   Resp 16   Wt 119 kg (262 lb)   SpO2 92%   BMI 39.84 kg/m²   Physical Exam  Vitals and nursing note reviewed.   Constitutional:       General: She is not in acute distress.     Appearance: She is not ill-appearing or toxic-appearing.   HENT:      Head: Normocephalic.      Nose: No rhinorrhea.   Eyes:      General: No scleral icterus.     Conjunctiva/sclera: Conjunctivae normal.   Pulmonary:      Effort: Pulmonary effort is normal. No respiratory distress.      Breath sounds: No stridor.   Musculoskeletal:      Cervical back: Neck supple.   Skin:     Comments: There is a patch of tinea corporis present over the right medial thigh.  Area is erythematous but neetu in appearance, there is surrounding white flaky skin on the lesion borders.  Area is pruritic and mildly tender " to palpation.  No signs of fluctuance or induration.  No signs of trauma   Neurological:      Mental Status: She is alert and oriented to person, place, and time.   Psychiatric:         Mood and Affect: Mood normal.         Behavior: Behavior normal.         Thought Content: Thought content normal.         Judgment: Judgment normal.             Diagnostic testing: None    Assessment/Plan:     Encounter Diagnoses   Name Primary?   • Tinea corporis         Plan for care for today's complaint includes Ketoconazole topical.  This should continue to monitor symptoms, ensure appropriate hygiene and drying of the area.  Patient is nonverbal and her aide was the main historian during this visit.  Prescription for Ketoconazole topical provided.    See AVS Instructions below for written guidance provided to patient on after-visit management and care in addition to our verbal discussion during the visit.    Please note that this dictation was created using voice recognition software. I have attempted to correct all errors, but there may be sound-alike, spelling, grammar and possibly content errors that I did not discover before finalizing the note.    Antony Ordonez PA-C

## 2022-07-12 RX ORDER — IBUPROFEN 200 MG
200 TABLET ORAL EVERY 6 HOURS PRN
COMMUNITY
End: 2022-07-12 | Stop reason: SDUPTHER

## 2022-07-12 RX ORDER — IBUPROFEN 200 MG
200 TABLET ORAL EVERY 6 HOURS PRN
Qty: 30 TABLET | Refills: 2 | Status: SHIPPED | OUTPATIENT
Start: 2022-07-12 | End: 2022-12-02 | Stop reason: SDUPTHER

## 2022-07-12 RX ORDER — IBUPROFEN 200 MG
200 TABLET ORAL EVERY 6 HOURS PRN
Qty: 30 TABLET | Refills: 2 | OUTPATIENT
Start: 2022-07-12

## 2022-07-14 ENCOUNTER — OFFICE VISIT (OUTPATIENT)
Dept: MEDICAL GROUP | Facility: CLINIC | Age: 42
End: 2022-07-14
Payer: COMMERCIAL

## 2022-07-14 VITALS
WEIGHT: 261.5 LBS | HEART RATE: 100 BPM | DIASTOLIC BLOOD PRESSURE: 82 MMHG | HEIGHT: 67 IN | TEMPERATURE: 97.2 F | BODY MASS INDEX: 41.04 KG/M2 | SYSTOLIC BLOOD PRESSURE: 128 MMHG

## 2022-07-14 DIAGNOSIS — E78.5 HYPERLIPIDEMIA, UNSPECIFIED HYPERLIPIDEMIA TYPE: ICD-10-CM

## 2022-07-14 DIAGNOSIS — N39.41 URGE INCONTINENCE OF URINE: ICD-10-CM

## 2022-07-14 DIAGNOSIS — B35.4 TINEA CORPORIS: ICD-10-CM

## 2022-07-14 PROCEDURE — 99214 OFFICE O/P EST MOD 30 MIN: CPT | Mod: GC | Performed by: STUDENT IN AN ORGANIZED HEALTH CARE EDUCATION/TRAINING PROGRAM

## 2022-07-14 RX ORDER — PRENATAL VIT 91/IRON/FOLIC/DHA 28-975-200
COMBINATION PACKAGE (EA) ORAL
Qty: 24 G | Refills: 0 | Status: SHIPPED | OUTPATIENT
Start: 2022-07-14

## 2022-07-14 RX ORDER — SULFAMETHOXAZOLE AND TRIMETHOPRIM 800; 160 MG/1; MG/1
1 TABLET ORAL 2 TIMES DAILY
Qty: 10 TABLET | Refills: 0 | Status: SHIPPED | OUTPATIENT
Start: 2022-07-14 | End: 2022-07-25

## 2022-07-14 ASSESSMENT — FIBROSIS 4 INDEX: FIB4 SCORE: 1.01

## 2022-07-14 NOTE — PROGRESS NOTES
[facility]    Chief Complaint   Patient presents with   • Follow-Up       HISTORY OF PRESENT ILLNESS: Patient is a 42 y.o. female established patient who presents today to discuss the medical issues below.    Problem   Tinea Corporis   Hyperlipidemia   Urinary Incontinence    Care giver states that she urinates her self during the day and at night. Does it frequently. Prompting patient to go to the bathroom every hour. Patient can describe when she has to go to the bathroom. Patient wears depends when she goes out and fills the diapers. Urinates every hour. No bloody urine. Had hysterectomy 2 years prior.          Patient Active Problem List    Diagnosis Date Noted   • Tinea corporis 07/14/2022   • Moderate intellectual disability 06/28/2022   • Uterine leiomyoma 06/28/2022   • Other microscopic hematuria 05/12/2022   • Hyperlipidemia 05/12/2022   • Fatigue 04/07/2022   • Cellulitis 06/10/2021   • Obesity with body mass index 30 or greater 04/29/2021   • Acute respiratory failure with hypoxia ([facility]) 12/19/2020   • COVID-19 12/19/2020   • Mental developmental delay    • Psychiatric problem    • Morbid obesity with body mass index of 40.0-44.9 in adult ([facility])    • Prediabetes 02/13/2020   • Elevated blood pressure reading without diagnosis of hypertension 01/28/2020   • Impacted cerumen of right ear 01/28/2020   • Unspecified hearing loss, right ear 01/28/2020   • Mood disorder ([facility]) 01/28/2020   • Obesity 01/28/2020   • Urinary tract infection, acute 08/07/2019   • Hx of hysterectomy 11/07/2018   • Pain in left knee 05/09/2018   • Urinary incontinence 05/03/2017   • Insect bites 08/19/2015   • Acne 06/03/2015   • Dandruff 06/03/2015   • Pelvic mass 02/12/2015   • Bipolar affective disorder ([facility]) 08/20/2014       Allergies:Nkda [no known drug allergy]    Current Outpatient Medications   Medication Sig Dispense Refill   • sulfamethoxazole-trimethoprim (BACTRIM DS) 800-160 MG tablet Take 1 Tablet by mouth 2 times  "a day. 10 Tablet 0   • terbinafine (LAMISIL) 1 % cream Apply cream to affected area twice a day until resolution of rash. Keep applying the cream for 1 week after resolution of rash. 24 g 0   • ibuprofen (MOTRIN) 200 MG Tab Take 1 Tablet by mouth every 6 hours as needed for Mild Pain. 30 Tablet 2   • atorvastatin (LIPITOR) 20 MG Tab Take 1 Tablet by mouth every evening for 90 days. 90 Tablet 0   • metFORMIN (GLUCOPHAGE) 500 MG Tab Take 1 Tablet by mouth 2 times a day with meals for 90 days. 180 Tablet 0   • acetaminophen (TYLENOL) 500 MG Tab Take 1-2 Tablets by mouth every 6 hours as needed for up to 90 days. 90 Tablet 3   • citalopram (CELEXA) 40 MG Tab      • oxybutynin SR (DITROPAN-XL) 5 MG TABLET SR 24 HR Take 1 Tablet by mouth every day. 30 Tablet 3   • risperiDONE (RISPERDAL) 0.5 MG Tab      • DULoxetine (CYMBALTA) 60 MG Cap DR Particles delayed-release capsule      • divalproex (DEPAKOTE SPRINKLE) 125 MG Capsule Delayed Release Sprinkle      • Multiple Vitamins-Minerals (CEROVITE PO) Take 1 Tab by mouth every morning.       No current facility-administered medications for this visit.         Past Medical History:   Diagnosis Date   • Mental developmental delay     pt mentally \"6 years old\"   • Other specified symptom associated with female genital organs    • Unspecified urinary incontinence        Social History     Tobacco Use   • Smoking status: Never Smoker   • Smokeless tobacco: Never Used   Vaping Use   • Vaping Use: Never used   Substance Use Topics   • Alcohol use: No   • Drug use: No       No family status information on file.   No family history on file.    ROS:  Negative except as stated above.       Exam:    /82 (BP Location: Right arm, Patient Position: Sitting, BP Cuff Size: Adult)   Pulse 100   Temp 36.2 °C (97.2 °F) (Temporal)   Ht 1.702 m (5' 7\")   Wt 119 kg (261 lb 8 oz)  Body mass index is 40.96 kg/m².  General:  Well nourished, well developed female in NAD.  HENT: Normocephalic, " bilateral TMs are intact, nasal and oral mucosa with no lesions,   Neck: Supple without bruit. Thyroid is not enlarged, no nodules palpated.  Pulmonary: Clear to ausculation.  Normal effort. No rales, rhonchi, or wheezing.  Cardiovascular: Regular rate and rhythm without murmur.   Abdomen: Normal bowel sounds, soft and nontender, no palpable liver, spleen, or masses.  Extremities: No LE edema noted. 5/5 strength in all extremities. Well demarcated patch of erythema on left medial thigh.   Neuro: Grossly nonfocal.  Psych: Alert and oriented to person, place, and time. Appropriate mood and conversation.              Assessment/Plan:    1. Urge incontinence of urine  sulfamethoxazole-trimethoprim (BACTRIM DS) 800-160 MG tablet    Referral to Urology   2. Tinea corporis  terbinafine (LAMISIL) 1 % cream   3. Hyperlipidemia, unspecified hyperlipidemia type         Orders Placed This Encounter   • Referral to Urology   • sulfamethoxazole-trimethoprim (BACTRIM DS) 800-160 MG tablet   • terbinafine (LAMISIL) 1 % cream       Urinary incontinence  Chronic.   Patient still urinating herself several times per day, care giver states that the oxybutinin has helped as she is doing it less. Urinalysis performed at last visit showed microscopic hematuria, leuk esterase, and WBCs, but neg nitrites. Culture was positive for mixed vaginal cisco.   Plan:   -Continue oxybutinin 5mg daily   -Start 5 day course of bactrim   -Referral to urology placed   -Follow up at next visit    Tinea corporis  Acute.  Patient recently seen at urgent care and diagnosed with tinea coporis. Patient has used ketoconazole cream for 2 weeks with limited effect. Exam looked typical of tinea coporis.   Plan:   -Start terbinafine cream. Apply to area BID until resolution. Continue treatment for a week after resolution.   -Will review at next visit.     Hyperlipidemia  Continue lipitor 20 every night       Followup: Return in about 1 month (around 8/14/2022).      Peng Kenyon MD   [facility]  PGY-2

## 2022-07-14 NOTE — ASSESSMENT & PLAN NOTE
Chronic.   Patient still urinating herself several times per day, care giver states that the oxybutinin has helped as she is doing it less. Urinalysis performed at last visit showed microscopic hematuria, leuk esterase, and WBCs, but neg nitrites. Culture was positive for mixed vaginal cisco.   Plan:   -Continue oxybutinin 5mg daily   -Start 5 day course of bactrim   -Referral to urology placed   -Follow up at next visit

## 2022-07-14 NOTE — ASSESSMENT & PLAN NOTE
Acute.  Patient recently seen at urgent care and diagnosed with tinea coporis. Patient has used ketoconazole cream for 2 weeks with limited effect. Exam looked typical of tinea coporis.   Plan:   -Start terbinafine cream. Apply to area BID until resolution. Continue treatment for a week after resolution.   -Will review at next visit.

## 2022-07-25 ENCOUNTER — HOSPITAL ENCOUNTER (EMERGENCY)
Facility: MEDICAL CENTER | Age: 42
End: 2022-07-25
Attending: STUDENT IN AN ORGANIZED HEALTH CARE EDUCATION/TRAINING PROGRAM
Payer: COMMERCIAL

## 2022-07-25 VITALS
WEIGHT: 264.77 LBS | RESPIRATION RATE: 16 BRPM | OXYGEN SATURATION: 98 % | HEIGHT: 67 IN | SYSTOLIC BLOOD PRESSURE: 142 MMHG | DIASTOLIC BLOOD PRESSURE: 78 MMHG | TEMPERATURE: 97.3 F | HEART RATE: 78 BPM | BODY MASS INDEX: 41.56 KG/M2

## 2022-07-25 DIAGNOSIS — N39.0 ACUTE UTI: ICD-10-CM

## 2022-07-25 LAB
APPEARANCE UR: ABNORMAL
BACTERIA #/AREA URNS HPF: ABNORMAL /HPF
BILIRUB UR QL STRIP.AUTO: NEGATIVE
COLOR UR: YELLOW
EPI CELLS #/AREA URNS HPF: ABNORMAL /HPF
GLUCOSE UR STRIP.AUTO-MCNC: NEGATIVE MG/DL
KETONES UR STRIP.AUTO-MCNC: ABNORMAL MG/DL
LEUKOCYTE ESTERASE UR QL STRIP.AUTO: ABNORMAL
MICRO URNS: ABNORMAL
MUCOUS THREADS #/AREA URNS HPF: ABNORMAL /HPF
NITRITE UR QL STRIP.AUTO: NEGATIVE
PH UR STRIP.AUTO: 5.5 [PH] (ref 5–8)
PROT UR QL STRIP: NEGATIVE MG/DL
RBC # URNS HPF: ABNORMAL /HPF
RBC UR QL AUTO: ABNORMAL
SP GR UR STRIP.AUTO: >=1.03
UNIDENT CRYS URNS QL MICRO: ABNORMAL /HPF
WBC #/AREA URNS HPF: ABNORMAL /HPF

## 2022-07-25 PROCEDURE — 700102 HCHG RX REV CODE 250 W/ 637 OVERRIDE(OP): Performed by: STUDENT IN AN ORGANIZED HEALTH CARE EDUCATION/TRAINING PROGRAM

## 2022-07-25 PROCEDURE — 81001 URINALYSIS AUTO W/SCOPE: CPT

## 2022-07-25 PROCEDURE — 99283 EMERGENCY DEPT VISIT LOW MDM: CPT

## 2022-07-25 PROCEDURE — A9270 NON-COVERED ITEM OR SERVICE: HCPCS | Performed by: STUDENT IN AN ORGANIZED HEALTH CARE EDUCATION/TRAINING PROGRAM

## 2022-07-25 RX ORDER — SULFAMETHOXAZOLE AND TRIMETHOPRIM 800; 160 MG/1; MG/1
1 TABLET ORAL 2 TIMES DAILY
Qty: 10 TABLET | Refills: 0 | Status: SHIPPED | OUTPATIENT
Start: 2022-07-25 | End: 2022-07-30

## 2022-07-25 RX ORDER — NAPROXEN 250 MG/1
375 TABLET ORAL ONCE
Status: COMPLETED | OUTPATIENT
Start: 2022-07-25 | End: 2022-07-25

## 2022-07-25 RX ORDER — NAPROXEN 375 MG/1
375 TABLET ORAL 2 TIMES DAILY WITH MEALS
Qty: 20 TABLET | Refills: 0 | Status: SHIPPED | OUTPATIENT
Start: 2022-07-25

## 2022-07-25 RX ADMIN — NAPROXEN 375 MG: 250 TABLET ORAL at 22:45

## 2022-07-25 ASSESSMENT — ENCOUNTER SYMPTOMS
FEVER: 0
EYE DISCHARGE: 0
EYE REDNESS: 0
SPEECH CHANGE: 0
LOSS OF CONSCIOUSNESS: 0
CHILLS: 0

## 2022-07-25 ASSESSMENT — FIBROSIS 4 INDEX: FIB4 SCORE: 1.01

## 2022-07-26 NOTE — ED PROVIDER NOTES
"ED Provider Note    Chief Complaint:   Dysuria    HPI:  Lidia Salcedo is a very pleasant totally 42-year-old female group home member with mental developmental delay who presents with dysuria and hematuria.  Patient was initially prescribed Bactrim but never received it per the patient's care provider at the bedside.  The patient also was complaining of left knee pain and has a history of gout.  Patient's symptoms do not include back pain, fevers, chills, body aches.  Patient has a history of urinary incontinence which may precipitate her frequent UTIs.    Review of Systems:  Review of Systems   Constitutional: Negative for chills and fever.   Eyes: Negative for discharge and redness.   Genitourinary: Positive for dysuria and hematuria.   Musculoskeletal: Positive for joint pain.   Neurological: Negative for speech change and loss of consciousness.       Family History:  No family history on file.    Past Medical History:   has a past medical history of Mental developmental delay, Other specified symptom associated with female genital organs, and Unspecified urinary incontinence.    Social History:  Social History     Tobacco Use   • Smoking status: Never Smoker   • Smokeless tobacco: Never Used   Vaping Use   • Vaping Use: Never used   Substance and Sexual Activity   • Alcohol use: No   • Drug use: No   • Sexual activity: Not on file       Surgical History:   has a past surgical history that includes other and hysterectomy robotic xi (2/12/2015).    Allergies:  Allergies   Allergen Reactions   • Nkda [No Known Drug Allergy]        Physical Exam:  Vital Signs: BP (!) 144/95   Pulse 100   Temp 36.3 °C (97.3 °F) (Temporal)   Resp 18   Ht 1.702 m (5' 7\")   Wt 120 kg (264 lb 12.4 oz)   LMP 01/19/2015   SpO2 95%   BMI 41.47 kg/m²   Physical Exam  Constitutional:       Appearance: She is not toxic-appearing.   HENT:      Head: Normocephalic and atraumatic.   Pulmonary:      Effort: Pulmonary effort is normal. " No respiratory distress.   Abdominal:      Comments: Mild suprapubic tenderness to palpation, no rebound, guarding or masses, no rigidity, no CVA tenderness to palpation   Musculoskeletal:      Comments: No tenderness palpation over the left knee, range of motion actively intact at the left knee. Distal affected extremity demonstrates intact sensation, motor, cap refill less than 2 seconds and 2+ pulses, warm and well perfused, no signs of tendon rupture, no crepitus on palpation.   Skin:     General: Skin is warm and dry.   Neurological:      Mental Status: She is alert.         Medical records reviewed for continuity of care.     Results for orders placed or performed during the hospital encounter of 07/25/22   URINALYSIS,CULTURE IF INDICATED    Specimen: Urine, Clean Catch   Result Value Ref Range    Color Yellow     Character Hazy (A)     Specific Gravity >=1.030 <1.035    Ph 5.5 5.0 - 8.0    Glucose Negative Negative mg/dL    Ketones Trace (A) Negative mg/dL    Protein Negative Negative mg/dL    Bilirubin Negative Negative    Nitrite Negative Negative    Leukocyte Esterase Small (A) Negative    Occult Blood Small (A) Negative    Micro Urine Req Microscopic    URINE MICROSCOPIC (W/UA)   Result Value Ref Range    WBC 5-10 (A) /hpf    RBC 2-5 (A) /hpf    Bacteria Few (A) None /hpf    Epithelial Cells Few Few /hpf    Mucous Threads Few /hpf    Urine Crystals Few Amorphous /hpf       Radiology:  No orders to display        MDM:  Urinalysis consistent with UTI, positive for blood, leukocyte esterase, bacteria, patient will be represcribed Bactrim, no evidence of pyelonephritis at this time, no CVA tenderness to palpation, stable vital signs.  Naprosyn for knee pain.  Patient prescribed Naprosyn as well for knee pain at home given history of gout, acute gout flare within the differential however range of motion is intact and symptoms are minimal at this time.  Disposition to group home with PCP  follow-up.    Electronically signed by: Alan Mcclure M.D., 7/25/2022, 10:45 PM    Repeat physical exam benign.  I doubt any serious emergency process at this time.  Patient and/or family, friends given strict return precautions and care instructions. They have demonstrated understanding of discharge instructions through teach back mechanism. Advised PCP follow-up in 1-2 days.  Patient/family/friend expresses understanding and agrees to plan.    This dictation has been created using voice recognition software. I am continuously working with the software to minimize the number of voice recognition errors and I have made every attempt to manually correct the errors within my dictation. However errors  related to this voice recognition software may still exist and should be interpreted within the appropriate context.     Electronically signed by: Alan Mcclure M.D., 7/25/2022 10:49 PM      Disposition:  Home    Final Impression:  1. Acute UTI        Electronically signed by: Alan Mcclure M.D., 7/25/2022 10:49 PM

## 2022-07-26 NOTE — ED TRIAGE NOTES
Pt currently has ring worm but is being treated for it  Pt frequently incontinent of urine since her hysterectomy

## 2022-08-02 ENCOUNTER — APPOINTMENT (OUTPATIENT)
Dept: MEDICAL GROUP | Facility: CLINIC | Age: 42
End: 2022-08-02
Payer: COMMERCIAL

## 2022-08-10 ENCOUNTER — TELEPHONE (OUTPATIENT)
Dept: MEDICAL GROUP | Facility: CLINIC | Age: 42
End: 2022-08-10
Payer: COMMERCIAL

## 2022-08-10 NOTE — TELEPHONE ENCOUNTER
Received request via: Patient    Was the patient seen in the last year in this department? Yes    Does the patient have an active prescription (recently filled or refills available) for medication(s) requested? No    Patient requesting refill on Ketoconazole 2% Shampoo

## 2022-08-11 DIAGNOSIS — B35.0: ICD-10-CM

## 2022-08-11 RX ORDER — KETOCONAZOLE 20 MG/ML
5 SHAMPOO TOPICAL
Qty: 120 ML | Refills: 0 | Status: SHIPPED | OUTPATIENT
Start: 2022-08-11 | End: 2022-09-02

## 2022-08-15 ENCOUNTER — OFFICE VISIT (OUTPATIENT)
Dept: URGENT CARE | Facility: CLINIC | Age: 42
End: 2022-08-15
Payer: COMMERCIAL

## 2022-08-15 VITALS
SYSTOLIC BLOOD PRESSURE: 126 MMHG | HEIGHT: 67 IN | BODY MASS INDEX: 40.18 KG/M2 | WEIGHT: 256 LBS | HEART RATE: 96 BPM | OXYGEN SATURATION: 95 % | TEMPERATURE: 97.7 F | DIASTOLIC BLOOD PRESSURE: 82 MMHG

## 2022-08-15 DIAGNOSIS — R60.9 EDEMA, UNSPECIFIED TYPE: ICD-10-CM

## 2022-08-15 PROCEDURE — 99213 OFFICE O/P EST LOW 20 MIN: CPT | Performed by: FAMILY MEDICINE

## 2022-08-15 RX ORDER — CLONAZEPAM 0.5 MG/1
TABLET ORAL
COMMUNITY

## 2022-08-15 RX ORDER — CITALOPRAM 40 MG/1
40 TABLET ORAL DAILY
COMMUNITY

## 2022-08-15 ASSESSMENT — FIBROSIS 4 INDEX: FIB4 SCORE: 1.01

## 2022-08-17 ENCOUNTER — TELEPHONE (OUTPATIENT)
Dept: MEDICAL GROUP | Facility: CLINIC | Age: 42
End: 2022-08-17
Payer: COMMERCIAL

## 2022-08-17 ENCOUNTER — HOSPITAL ENCOUNTER (OUTPATIENT)
Dept: LAB | Facility: MEDICAL CENTER | Age: 42
End: 2022-08-17
Attending: FAMILY MEDICINE
Payer: COMMERCIAL

## 2022-08-17 DIAGNOSIS — R60.9 EDEMA, UNSPECIFIED TYPE: ICD-10-CM

## 2022-08-17 LAB
ALBUMIN SERPL BCP-MCNC: 4.2 G/DL (ref 3.2–4.9)
ALBUMIN/GLOB SERPL: 1.3 G/DL
ALP SERPL-CCNC: 66 U/L (ref 30–99)
ALT SERPL-CCNC: 20 U/L (ref 2–50)
ANION GAP SERPL CALC-SCNC: 11 MMOL/L (ref 7–16)
AST SERPL-CCNC: 16 U/L (ref 12–45)
BASOPHILS # BLD AUTO: 0.5 % (ref 0–1.8)
BASOPHILS # BLD: 0.05 K/UL (ref 0–0.12)
BILIRUB SERPL-MCNC: 0.2 MG/DL (ref 0.1–1.5)
BUN SERPL-MCNC: 11 MG/DL (ref 8–22)
CALCIUM SERPL-MCNC: 9.4 MG/DL (ref 8.5–10.5)
CHLORIDE SERPL-SCNC: 103 MMOL/L (ref 96–112)
CO2 SERPL-SCNC: 25 MMOL/L (ref 20–33)
CREAT SERPL-MCNC: 0.74 MG/DL (ref 0.5–1.4)
EOSINOPHIL # BLD AUTO: 0.04 K/UL (ref 0–0.51)
EOSINOPHIL NFR BLD: 0.4 % (ref 0–6.9)
ERYTHROCYTE [DISTWIDTH] IN BLOOD BY AUTOMATED COUNT: 41.8 FL (ref 35.9–50)
GFR SERPLBLD CREATININE-BSD FMLA CKD-EPI: 103 ML/MIN/1.73 M 2
GLOBULIN SER CALC-MCNC: 3.2 G/DL (ref 1.9–3.5)
GLUCOSE SERPL-MCNC: 105 MG/DL (ref 65–99)
HCT VFR BLD AUTO: 44.2 % (ref 37–47)
HGB BLD-MCNC: 15.1 G/DL (ref 12–16)
IMM GRANULOCYTES # BLD AUTO: 0.06 K/UL (ref 0–0.11)
IMM GRANULOCYTES NFR BLD AUTO: 0.6 % (ref 0–0.9)
LYMPHOCYTES # BLD AUTO: 4.24 K/UL (ref 1–4.8)
LYMPHOCYTES NFR BLD: 43.9 % (ref 22–41)
MCH RBC QN AUTO: 32.1 PG (ref 27–33)
MCHC RBC AUTO-ENTMCNC: 34.2 G/DL (ref 33.6–35)
MCV RBC AUTO: 94 FL (ref 81.4–97.8)
MONOCYTES # BLD AUTO: 0.54 K/UL (ref 0–0.85)
MONOCYTES NFR BLD AUTO: 5.6 % (ref 0–13.4)
NEUTROPHILS # BLD AUTO: 4.73 K/UL (ref 2–7.15)
NEUTROPHILS NFR BLD: 49 % (ref 44–72)
NRBC # BLD AUTO: 0 K/UL
NRBC BLD-RTO: 0 /100 WBC
NT-PROBNP SERPL IA-MCNC: 43 PG/ML (ref 0–125)
PLATELET # BLD AUTO: 199 K/UL (ref 164–446)
PMV BLD AUTO: 12.8 FL (ref 9–12.9)
POTASSIUM SERPL-SCNC: 3.8 MMOL/L (ref 3.6–5.5)
PROT SERPL-MCNC: 7.4 G/DL (ref 6–8.2)
RBC # BLD AUTO: 4.7 M/UL (ref 4.2–5.4)
SODIUM SERPL-SCNC: 139 MMOL/L (ref 135–145)
WBC # BLD AUTO: 9.7 K/UL (ref 4.8–10.8)

## 2022-08-17 PROCEDURE — 36415 COLL VENOUS BLD VENIPUNCTURE: CPT

## 2022-08-17 PROCEDURE — 80053 COMPREHEN METABOLIC PANEL: CPT

## 2022-08-17 PROCEDURE — 83880 ASSAY OF NATRIURETIC PEPTIDE: CPT

## 2022-08-17 PROCEDURE — 85025 COMPLETE CBC W/AUTO DIFF WBC: CPT

## 2022-08-17 NOTE — TELEPHONE ENCOUNTER
"[facility] called, they need clarification on sig for shampoo. Previous sig said \"Wash twice weekly as directed\" and they said \"current sig sounds like a topical cream rather than a shampoo\"  They are requesting sig change and re-sent to [facility].  "

## 2022-09-02 DIAGNOSIS — E78.5 HYPERLIPIDEMIA, UNSPECIFIED HYPERLIPIDEMIA TYPE: ICD-10-CM

## 2022-09-02 DIAGNOSIS — B35.0: ICD-10-CM

## 2022-09-02 DIAGNOSIS — R73.03 PREDIABETES: ICD-10-CM

## 2022-09-02 RX ORDER — ATORVASTATIN CALCIUM 20 MG/1
TABLET, FILM COATED ORAL
Qty: 90 TABLET | Refills: 3 | Status: SHIPPED | OUTPATIENT
Start: 2022-09-02 | End: 2023-08-30

## 2022-09-02 RX ORDER — KETOCONAZOLE 20 MG/ML
SHAMPOO TOPICAL
Qty: 120 ML | Refills: 0 | Status: SHIPPED | OUTPATIENT
Start: 2022-09-02 | End: 2022-12-02 | Stop reason: SDUPTHER

## 2022-09-02 NOTE — PROGRESS NOTES
"Chief Complaint   Patient presents with    Edema     Bilateral hands,legs, ankles x 2 days           HPI:      Pt was brought in for bilateral swelling in hands legs and ankles x 2 days.   She denies pain, sob.   No fevers or chills.   Denies cough.   She has not changed her diet recently.          Past Medical History:   Diagnosis Date    Mental developmental delay     pt mentally \"6 years old\"    Other specified symptom associated with female genital organs     Unspecified urinary incontinence      Social History     Tobacco Use    Smoking status: Never    Smokeless tobacco: Never   Vaping Use    Vaping Use: Never used   Substance Use Topics    Alcohol use: No    Drug use: No         Review of Systems   Constitutional: Negative for fever, chills and malaise/fatigue.   Eyes: Negative for vision changes, d/c.    Respiratory: Negative for cough and sputum production.    Cardiovascular: Negative for chest pain and palpitations.   Gastrointestinal: Negative for nausea, vomiting, abdominal pain, diarrhea and constipation.   Genitourinary: Negative for dysuria, urgency and frequency.   Skin: Negative for rash or  itching.   Neurological: Negative for dizziness and tingling.   Psychiatric/Behavioral: Negative for depression.   Hematologic/lymphatic - denies bruising or excessive bleeding  All other systems reviewed and are negative.    OBJECTIVE  /82 (BP Location: Right arm, Patient Position: Sitting, BP Cuff Size: Large adult)   Pulse 96   Temp 36.5 °C (97.7 °F) (Temporal)   Ht 1.702 m (5' 7\")   Wt 116 kg (256 lb)   SpO2 95%   General:  NAD  HEENT - PERRLA, EOMI  Neuro - alert and oriented .  CN 2-12 intact to screen  Lungs - CTA. No wheezes, rhonchi or rales.  Heart - regular rate and rhythm without murmur.  Abdomen - soft and non-tender, bowel sounds active x4.  Ext:   trace pedal edema noted.      Sadie's sign negative bilaterally         A/P:    1. Edema, unspecified type   Etiology unclear     - CBC WITH " DIFFERENTIAL; Future  - Comp Metabolic Panel; Future  - TSH  - proBrain Natriuretic Peptide, NT; Future    Follow up in one week if no improvement, sooner if symptoms worsen.

## 2022-09-05 ENCOUNTER — PATIENT MESSAGE (OUTPATIENT)
Dept: MEDICAL GROUP | Facility: CLINIC | Age: 42
End: 2022-09-05
Payer: COMMERCIAL

## 2022-09-13 ENCOUNTER — HOSPITAL ENCOUNTER (OUTPATIENT)
Facility: MEDICAL CENTER | Age: 42
End: 2022-09-13
Attending: STUDENT IN AN ORGANIZED HEALTH CARE EDUCATION/TRAINING PROGRAM
Payer: COMMERCIAL

## 2022-09-13 PROCEDURE — 87086 URINE CULTURE/COLONY COUNT: CPT

## 2022-09-13 PROCEDURE — 87077 CULTURE AEROBIC IDENTIFY: CPT

## 2022-09-16 LAB
BACTERIA UR CULT: ABNORMAL
BACTERIA UR CULT: ABNORMAL
SIGNIFICANT IND 70042: ABNORMAL
SITE SITE: ABNORMAL
SOURCE SOURCE: ABNORMAL

## 2022-10-14 ENCOUNTER — OFFICE VISIT (OUTPATIENT)
Dept: MEDICAL GROUP | Facility: CLINIC | Age: 42
End: 2022-10-14
Payer: COMMERCIAL

## 2022-10-14 DIAGNOSIS — G89.29 CHRONIC PAIN OF BOTH KNEES: ICD-10-CM

## 2022-10-14 DIAGNOSIS — E66.01 MORBID OBESITY WITH BODY MASS INDEX OF 40.0-44.9 IN ADULT (HCC): Chronic | ICD-10-CM

## 2022-10-14 DIAGNOSIS — M25.562 CHRONIC PAIN OF BOTH KNEES: ICD-10-CM

## 2022-10-14 DIAGNOSIS — F71 MODERATE INTELLECTUAL DISABILITY: ICD-10-CM

## 2022-10-14 DIAGNOSIS — M25.561 CHRONIC PAIN OF BOTH KNEES: ICD-10-CM

## 2022-10-14 DIAGNOSIS — N39.41 URGE INCONTINENCE OF URINE: ICD-10-CM

## 2022-10-14 DIAGNOSIS — Z12.31 ENCOUNTER FOR SCREENING MAMMOGRAM FOR BREAST CANCER: ICD-10-CM

## 2022-10-14 PROCEDURE — 99213 OFFICE O/P EST LOW 20 MIN: CPT | Mod: GE | Performed by: STUDENT IN AN ORGANIZED HEALTH CARE EDUCATION/TRAINING PROGRAM

## 2022-10-14 ASSESSMENT — FIBROSIS 4 INDEX: FIB4 SCORE: 0.77

## 2022-10-14 NOTE — PROGRESS NOTES
[facility]    Chief Complaint   Patient presents with    Follow-Up     No concerns        HISTORY OF PRESENT ILLNESS: Patient is a 42 y.o. female established patient who presents today to discuss the medical issues below.    Problem   Urge Incontinence of Urine   Morbid Obesity With Body Mass Index of 40.0-44.9 in Adult ([facility])   Chronic Pain of Both Knees    No history of trauma to the knee. Knee hurts all the time. Worse on ambulation. Patient can only walk a few steps before having to stop. Worse in the morning.      Urinary Incontinence (Resolved)    Care giver states that she urinates her self during the day and at night. Does it frequently. Prompting patient to go to the bathroom every hour. Patient can describe when she has to go to the bathroom. Patient wears depends when she goes out and fills the diapers. Urinates every hour. No bloody urine. Had hysterectomy 2 years prior.          Patient Active Problem List    Diagnosis Date Noted    Urge incontinence of urine 10/16/2022    Tinea corporis 07/14/2022    Moderate intellectual disability 06/28/2022    Uterine leiomyoma 06/28/2022    Other microscopic hematuria 05/12/2022    Hyperlipidemia 05/12/2022    Fatigue 04/07/2022    Cellulitis 06/10/2021    Obesity with body mass index 30 or greater 04/29/2021    Acute respiratory failure with hypoxia ([facility]) 12/19/2020    COVID-19 12/19/2020    Mental developmental delay     Psychiatric problem     Morbid obesity with body mass index of 40.0-44.9 in adult ([facility])     Prediabetes 02/13/2020    Elevated blood pressure reading without diagnosis of hypertension 01/28/2020    Impacted cerumen of right ear 01/28/2020    Unspecified hearing loss, right ear 01/28/2020    Mood disorder ([facility]) 01/28/2020    Obesity 01/28/2020    Urinary tract infection, acute 08/07/2019    Hx of hysterectomy 11/07/2018    Chronic pain of both knees 05/09/2018    Insect bites 08/19/2015    Acne 06/03/2015    Dandruff 06/03/2015    Pelvic  "mass 02/12/2015    Bipolar affective disorder (HCC) 08/20/2014       Allergies:Nkda [no known drug allergy]    Current Outpatient Medications   Medication Sig Dispense Refill    atorvastatin (LIPITOR) 20 MG Tab TAKE 1 TABLET BY MOUTH EVERY EVENING 90 Tablet 3    ketoconazole (NIZORAL) 2 % shampoo USE 5 ML TO WASH ONCE A DAY AS NEEDED FOR ITCHING FOR UP TO 24 DAYS 120 mL 0    metFORMIN (GLUCOPHAGE) 500 MG Tab TAKE 1 TABLET BY MOUTH 2 TIMES DAILY WITH MEALS 90 Tablet 3    citalopram (CELEXA) 40 MG Tab Take 40 mg by mouth every day.      clonazePAM (KLONOPIN) 0.5 MG Tab Take  by mouth 1 time a day as needed.      naproxen (NAPROSYN) 375 MG Tab Take 1 Tablet by mouth 2 times a day with meals. 20 Tablet 0    terbinafine (LAMISIL) 1 % cream Apply cream to affected area twice a day until resolution of rash. Keep applying the cream for 1 week after resolution of rash. 24 g 0    ibuprofen (MOTRIN) 200 MG Tab Take 1 Tablet by mouth every 6 hours as needed for Mild Pain. 30 Tablet 2    oxybutynin SR (DITROPAN-XL) 5 MG TABLET SR 24 HR Take 1 Tablet by mouth every day. 30 Tablet 3    risperiDONE (RISPERDAL) 0.5 MG Tab       DULoxetine (CYMBALTA) 60 MG Cap DR Particles delayed-release capsule       divalproex (DEPAKOTE SPRINKLE) 125 MG Capsule Delayed Release Sprinkle       Multiple Vitamins-Minerals (CEROVITE PO) Take 1 Tab by mouth every morning.       No current facility-administered medications for this visit.         Past Medical History:   Diagnosis Date    Mental developmental delay     pt mentally \"6 years old\"    Other specified symptom associated with female genital organs     Unspecified urinary incontinence        Social History     Tobacco Use    Smoking status: Never    Smokeless tobacco: Never   Vaping Use    Vaping Use: Never used   Substance Use Topics    Alcohol use: No    Drug use: No       No family status information on file.   No family history on file.    ROS:  Negative except as stated above.       Exam:  "   Vitals:    10/14/22 1357   BP: (P) 130/82   Pulse: (!) (P) 110   Temp: (P) 36.4 °C (97.5 °F)   SpO2: (P) 94%     Body mass index is 38.27 kg/m² (pended).  General:  Well nourished, well developed female in NAD.  HENT: Normocephalic, bilateral TMs are intact, nasal and oral mucosa with no lesions,   Neck: Supple without bruit. Thyroid is not enlarged, no nodules palpated.  Pulmonary: Clear to ausculation.  Normal effort. No rales, rhonchi, or wheezing.  Cardiovascular: Regular rate and rhythm without murmur.   Abdomen: Normal bowel sounds, soft and nontender, no palpable liver, spleen, or masses.  Extremities: No LE edema noted. 5/5 strength in all extremities  Neuro: Grossly nonfocal.  Psych: Alert and oriented to person, place, and time. Appropriate mood and conversation.              Assessment/Plan:    1. Morbid obesity with body mass index of 40.0-44.9 in adult (HCC)        2. Moderate intellectual disability        3. Urge incontinence of urine        4. Chronic pain of both knees        5. Encounter for screening mammogram for breast cancer  MA-SCREENING MAMMO BILAT W/TOMOSYNTHESIS W/CAD          Orders Placed This Encounter    MA-SCREENING MAMMO BILAT W/TOMOSYNTHESIS W/CAD         Morbid obesity with body mass index of 40.0-44.9 in adult (HCC)  Chronic.  Discussed patient's diet and exercise.  Patient has been eating a healthy and well-balanced diet consisting of healthy proteins, vegetables, and fruits.  Patient gets frequent exercise and goes for frequent walks around the [facility].  Patient has been losing weight and is happy with her progress.  Plan:  Continue healthy diet and exercise    Chronic pain of both knees  Chronic.  Patient has chronic knee pain likely secondary to osteoarthritis of the knees.  This pain is currently well controlled when she uses a knee brace.  Condition currently stable and no changes today.    Urge incontinence of urine  Chronic.  Patient has history of urge incontinence  of urine.  She is currently on oxybutynin for this problem and has noticed that it works very well.  She has had much less incontinence since starting this medication.  Condition currently stable.  Plan:  Continue oxybutynin    Patient was noted to be tachycardic in her vitals.  Her pulse was retaken on physical exam after she had been sitting for about 20 minutes and her heart rate was 90 and regular.  No further actions taken.      Followup: Return in about 6 months (around 4/14/2023).     Peng Kenyon MD   [facility]   PGY-3

## 2022-10-16 PROBLEM — N39.41 URGE INCONTINENCE OF URINE: Status: ACTIVE | Noted: 2022-10-16

## 2022-10-16 PROBLEM — M25.561 CHRONIC PAIN OF BOTH KNEES: Status: ACTIVE | Noted: 2018-05-09

## 2022-10-16 PROBLEM — R32 URINARY INCONTINENCE: Status: RESOLVED | Noted: 2017-05-03 | Resolved: 2022-10-16

## 2022-10-16 PROBLEM — G89.29 CHRONIC PAIN OF BOTH KNEES: Status: ACTIVE | Noted: 2018-05-09

## 2022-10-16 NOTE — ASSESSMENT & PLAN NOTE
Chronic.  Discussed patient's diet and exercise.  Patient has been eating a healthy and well-balanced diet consisting of healthy proteins, vegetables, and fruits.  Patient gets frequent exercise and goes for frequent walks around the [facility].  Patient has been losing weight and is happy with her progress.  Plan:  Continue healthy diet and exercise

## 2022-10-16 NOTE — ASSESSMENT & PLAN NOTE
Chronic.  Patient has chronic knee pain likely secondary to osteoarthritis of the knees.  This pain is currently well controlled when she uses a knee brace.  Condition currently stable and no changes today.

## 2022-10-16 NOTE — ASSESSMENT & PLAN NOTE
Chronic.  Patient has history of urge incontinence of urine.  She is currently on oxybutynin for this problem and has noticed that it works very well.  She has had much less incontinence since starting this medication.  Condition currently stable.  Plan:  Continue oxybutynin

## 2022-12-02 DIAGNOSIS — B35.0: ICD-10-CM

## 2022-12-05 RX ORDER — KETOCONAZOLE 20 MG/ML
SHAMPOO TOPICAL
Qty: 120 ML | Refills: 0 | Status: SHIPPED | OUTPATIENT
Start: 2022-12-05

## 2022-12-05 RX ORDER — IBUPROFEN 200 MG
200 TABLET ORAL EVERY 6 HOURS PRN
Qty: 30 TABLET | Refills: 2 | Status: SHIPPED | OUTPATIENT
Start: 2022-12-05 | End: 2024-01-30 | Stop reason: SDUPTHER

## 2023-01-30 ENCOUNTER — PATIENT MESSAGE (OUTPATIENT)
Dept: MEDICAL GROUP | Facility: CLINIC | Age: 43
End: 2023-01-30
Payer: MEDICAID

## 2023-05-03 RX ORDER — PSEUDOEPHED/ACETAMINOPH/DIPHEN 30MG-500MG
TABLET ORAL
Qty: 90 TABLET | Refills: 0 | Status: SHIPPED | OUTPATIENT
Start: 2023-05-03 | End: 2024-03-22 | Stop reason: SDUPTHER

## 2023-05-18 ENCOUNTER — OFFICE VISIT (OUTPATIENT)
Dept: MEDICAL GROUP | Facility: CLINIC | Age: 43
End: 2023-05-18
Payer: MEDICAID

## 2023-05-18 VITALS
SYSTOLIC BLOOD PRESSURE: 112 MMHG | DIASTOLIC BLOOD PRESSURE: 72 MMHG | BODY MASS INDEX: 36.92 KG/M2 | HEART RATE: 81 BPM | HEIGHT: 69 IN | WEIGHT: 249.25 LBS | OXYGEN SATURATION: 97 %

## 2023-05-18 DIAGNOSIS — R53.83 FATIGUE, UNSPECIFIED TYPE: ICD-10-CM

## 2023-05-18 DIAGNOSIS — E66.01 MORBID OBESITY WITH BODY MASS INDEX OF 40.0-44.9 IN ADULT (HCC): ICD-10-CM

## 2023-05-18 DIAGNOSIS — Z12.31 ENCOUNTER FOR SCREENING MAMMOGRAM FOR MALIGNANT NEOPLASM OF BREAST: ICD-10-CM

## 2023-05-18 DIAGNOSIS — R73.03 PREDIABETES: ICD-10-CM

## 2023-05-18 LAB
HBA1C MFR BLD: 5.6 % (ref ?–5.8)
POCT INT CON NEG: NEGATIVE
POCT INT CON POS: POSITIVE

## 2023-05-18 PROCEDURE — 3074F SYST BP LT 130 MM HG: CPT | Performed by: STUDENT IN AN ORGANIZED HEALTH CARE EDUCATION/TRAINING PROGRAM

## 2023-05-18 PROCEDURE — 99396 PREV VISIT EST AGE 40-64: CPT | Mod: GE,EP | Performed by: STUDENT IN AN ORGANIZED HEALTH CARE EDUCATION/TRAINING PROGRAM

## 2023-05-18 PROCEDURE — 3078F DIAST BP <80 MM HG: CPT | Performed by: STUDENT IN AN ORGANIZED HEALTH CARE EDUCATION/TRAINING PROGRAM

## 2023-05-18 PROCEDURE — 83036 HEMOGLOBIN GLYCOSYLATED A1C: CPT | Mod: QW | Performed by: STUDENT IN AN ORGANIZED HEALTH CARE EDUCATION/TRAINING PROGRAM

## 2023-05-18 ASSESSMENT — FIBROSIS 4 INDEX: FIB4 SCORE: 0.77

## 2023-05-18 NOTE — PROGRESS NOTES
Subjective:     CC:   Chief Complaint   Patient presents with    Annual Exam       HPI:   Lidia Salcedo is a 42 y.o. female who presents for annual exam    #Mood instability   -Worried about hormones   -Emotional liability noted at her home     Patient has GYN provider: No   Last Pap Smear: None in records, patient and care provider unsure when last one   H/O Abnormal Pap: Patient cannot recall  Last Mammogram: Never had a mammogram, unknown family history of breast cancer  Last Bone Density Test: N/A  Last Colorectal Cancer Screening: Not due yet, unknown family history   Last Tdap: Not due  Received HPV series: No    Exercise: strenuous regular exercise, aerobic > 3 hours a week  Diet: Well balanced, does not drink sodas, tries to avoid sugary drinks       Patient's last menstrual period was 01/19/2015.  -Patient had bilateral oopherectomy and hysterectomy in 2015.     OB History   No obstetric history on file.      She  has no history on file for sexual activity.    She  has a past medical history of Mental developmental delay, Other specified symptom associated with female genital organs, and Unspecified urinary incontinence.  She  has a past surgical history that includes other and hysterectomy robotic xi (2/12/2015).    No family history on file.  Social History     Tobacco Use    Smoking status: Never    Smokeless tobacco: Never   Vaping Use    Vaping Use: Never used   Substance Use Topics    Alcohol use: No    Drug use: No       Patient Active Problem List    Diagnosis Date Noted    Urge incontinence of urine 10/16/2022    Tinea corporis 07/14/2022    Moderate intellectual disability 06/28/2022    Uterine leiomyoma 06/28/2022    Other microscopic hematuria 05/12/2022    Hyperlipidemia 05/12/2022    Fatigue 04/07/2022    Cellulitis 06/10/2021    Obesity with body mass index 30 or greater 04/29/2021    Acute respiratory failure with hypoxia (HCC) 12/19/2020    COVID-19 12/19/2020    Mental developmental  delay     Psychiatric problem     Morbid obesity with body mass index of 40.0-44.9 in adult ([facility])     Prediabetes 02/13/2020    Elevated blood pressure reading without diagnosis of hypertension 01/28/2020    Impacted cerumen of right ear 01/28/2020    Unspecified hearing loss, right ear 01/28/2020    Mood disorder ([facility]) 01/28/2020    Obesity 01/28/2020    Urinary tract infection, acute 08/07/2019    Hx of hysterectomy 11/07/2018    Chronic pain of both knees 05/09/2018    Insect bites 08/19/2015    Acne 06/03/2015    Dandruff 06/03/2015    Pelvic mass 02/12/2015    Bipolar affective disorder ([facility]) 08/20/2014     Current Outpatient Medications   Medication Sig Dispense Refill    ACETAMINOPHEN EXTRA STRENGTH 500 MG Tab TAKE 1 TO 2 TABLETS BY MOUTH EVERY 6 HOURS AS NEEDED 90 Tablet 0    metFORMIN (GLUCOPHAGE) 500 MG Tab TAKE 1 TABLET BY MOUTH 2 TIMES DAILY WITH MEALS 60 Tablet 5    ketoconazole (NIZORAL) 2 % shampoo USE 5 ML TO WASH ONCE A DAY AS NEEDED FOR ITCHING FOR UP TO 24 DAYS Strength: 2 % 120 mL 0    ibuprofen (MOTRIN) 200 MG Tab Take 1 Tablet by mouth every 6 hours as needed for Mild Pain. 30 Tablet 2    atorvastatin (LIPITOR) 20 MG Tab TAKE 1 TABLET BY MOUTH EVERY EVENING 90 Tablet 3    citalopram (CELEXA) 40 MG Tab Take 40 mg by mouth every day.      clonazePAM (KLONOPIN) 0.5 MG Tab Take  by mouth 1 time a day as needed.      oxybutynin SR (DITROPAN-XL) 5 MG TABLET SR 24 HR Take 1 Tablet by mouth every day. 30 Tablet 3    risperiDONE (RISPERDAL) 0.5 MG Tab       divalproex (DEPAKOTE SPRINKLE) 125 MG Capsule Delayed Release Sprinkle       Multiple Vitamins-Minerals (CEROVITE PO) Take 1 Tab by mouth every morning.      naproxen (NAPROSYN) 375 MG Tab Take 1 Tablet by mouth 2 times a day with meals. (Patient not taking: Reported on 5/18/2023) 20 Tablet 0    terbinafine (LAMISIL) 1 % cream Apply cream to affected area twice a day until resolution of rash. Keep applying the cream for 1 week after resolution of  "rash. (Patient not taking: Reported on 5/18/2023) 24 g 0    DULoxetine (CYMBALTA) 60 MG Cap DR Particles delayed-release capsule  (Patient not taking: Reported on 5/18/2023)       No current facility-administered medications for this visit.     Allergies   Allergen Reactions    Nkda [No Known Drug Allergy]        Review of Systems   Constitutional: Negative for fever, chills and malaise/fatigue.   HENT: Negative for congestion.    Eyes: Negative for pain.   Respiratory: Negative for cough and shortness of breath.    Cardiovascular: Negative for chest pain and leg swelling.   Gastrointestinal: Negative for nausea, vomiting, abdominal pain and diarrhea.   Genitourinary: Negative for dysuria and hematuria.   Skin: Negative for rash.   Neurological: Negative for dizziness, focal weakness and headaches.   Endo/Heme/Allergies: Does not bruise/bleed easily.   Psychiatric/Behavioral: Negative for depression.  The patient is not nervous/anxious.      Objective:   /72 (BP Location: Left arm, Patient Position: Sitting, BP Cuff Size: Large adult)   Pulse 81   Ht 1.753 m (5' 9\")   Wt 113 kg (249 lb 4 oz)   LMP 01/19/2015   SpO2 97%   BMI 36.81 kg/m²     Wt Readings from Last 4 Encounters:   05/18/23 113 kg (249 lb 4 oz)   10/14/22 (P) 114 kg (251 lb 11.2 oz)   08/15/22 116 kg (256 lb)   07/25/22 120 kg (264 lb 12.4 oz)         Physical Exam:  Constitutional: Well-developed and well-nourished. Not diaphoretic. No distress.   Skin: Skin is warm and dry. No rash noted.  Head: Atraumatic without lesions.  Eyes: Conjunctivae and extraocular motions are normal. Pupils are equal, round, and reactive to light. No scleral icterus.   Ears:  External ears unremarkable. Tympanic membranes clear and intact.  Nose: Nares patent. Septum midline. Turbinates without erythema nor edema. No discharge.   Mouth/Throat: Tongue normal. Oropharynx is clear and moist. Posterior pharynx without erythema or exudates.  Neck: Supple, trachea " midline. Normal range of motion. No thyromegaly present. No lymphadenopathy--cervical or supraclavicular.  Cardiovascular: Regular rate and rhythm, S1 and S2 without murmur, rubs, or gallops.    Respiratory: Effort normal. Clear to auscultation throughout. No adventitious sounds.   Abdomen: Soft, non tender, and without distention. Active bowel sounds in all four quadrants. No rebound, guarding, masses or HSM.  Extremities: No cyanosis, clubbing, erythema, nor edema. Distal pulses intact and symmetric.   Musculoskeletal: All major joints AROM full in all directions without pain.  Neurological: Alert and oriented x 3. Grossly non-focal. Strength and sensation grossly intact. DTRs 2+/3 and symmetric.   Psychiatric:  Behavior, mood, and affect are appropriate.    Assessment and Plan:     1. Encounter for screening mammogram for malignant neoplasm of breast  - MA-SCREENING MAMMO BILAT W/TOMOSYNTHESIS W/CAD; Future    2. Prediabetes  - POCT  A1C    3. Fatigue, unspecified type  - CBC WITH DIFFERENTIAL; Future  - TSH WITH REFLEX TO FT4; Future    4. Morbid obesity with body mass index of 40.0-44.9 in adult (HCC)  - Comp Metabolic Panel; Future  - Lipid Profile; Future      Health maintenance:  Labs per orders   Labs per orders  Immunizations per orders  Patient counseled about skin care, diet, supplements, and exercise.  Discussed  mammography screening, feminine hygiene, family planning choices, adequate intake of calcium and vitamin D, diet and exercise, colorectal cancer screening    Patient has history of bilateral oophorectomy and hysterectomy. Unknown by both notes and history if patient still has a cervix. No cervical cancer screening has been completed since procedure.  unsure about history and will try to obtain records from surgeon. Patient also has history of bilateral knee pain. No history of trauma, likely secondary to osteoarthritis. Worked up on previous visits. Advised on knee braces and  physical therapy. Vaccines discussed and patient got all childhood vaccines. Discussed COVID boosters and patient will attempt to get at pharmacy. Patient not currently sexually active and unlikely to have ever been sexually active per  history. Deferred on Hep C Screening.     Follow-up: Return in about 1 year (around 5/18/2024).

## 2023-06-06 DIAGNOSIS — N39.41 URGE INCONTINENCE OF URINE: ICD-10-CM

## 2023-06-06 RX ORDER — OXYBUTYNIN CHLORIDE 5 MG/1
5 TABLET, EXTENDED RELEASE ORAL DAILY
Qty: 30 TABLET | Refills: 3 | Status: SHIPPED | OUTPATIENT
Start: 2023-06-06 | End: 2023-10-04 | Stop reason: SDUPTHER

## 2023-06-15 ENCOUNTER — HOSPITAL ENCOUNTER (OUTPATIENT)
Dept: LAB | Facility: MEDICAL CENTER | Age: 43
End: 2023-06-15
Attending: STUDENT IN AN ORGANIZED HEALTH CARE EDUCATION/TRAINING PROGRAM
Payer: MEDICAID

## 2023-06-15 DIAGNOSIS — R53.83 FATIGUE, UNSPECIFIED TYPE: ICD-10-CM

## 2023-06-15 DIAGNOSIS — E66.01 MORBID OBESITY WITH BODY MASS INDEX OF 40.0-44.9 IN ADULT (HCC): ICD-10-CM

## 2023-06-15 LAB
ALBUMIN SERPL BCP-MCNC: 4.4 G/DL (ref 3.2–4.9)
ALBUMIN/GLOB SERPL: 1.4 G/DL
ALP SERPL-CCNC: 68 U/L (ref 30–99)
ALT SERPL-CCNC: 18 U/L (ref 2–50)
ANION GAP SERPL CALC-SCNC: 16 MMOL/L (ref 7–16)
AST SERPL-CCNC: 16 U/L (ref 12–45)
BASOPHILS # BLD AUTO: 0.4 % (ref 0–1.8)
BASOPHILS # BLD: 0.03 K/UL (ref 0–0.12)
BILIRUB SERPL-MCNC: 0.3 MG/DL (ref 0.1–1.5)
BUN SERPL-MCNC: 10 MG/DL (ref 8–22)
CALCIUM ALBUM COR SERPL-MCNC: 9.3 MG/DL (ref 8.5–10.5)
CALCIUM SERPL-MCNC: 9.6 MG/DL (ref 8.5–10.5)
CHLORIDE SERPL-SCNC: 102 MMOL/L (ref 96–112)
CHOLEST SERPL-MCNC: 188 MG/DL (ref 100–199)
CO2 SERPL-SCNC: 22 MMOL/L (ref 20–33)
CREAT SERPL-MCNC: 0.72 MG/DL (ref 0.5–1.4)
EOSINOPHIL # BLD AUTO: 0.05 K/UL (ref 0–0.51)
EOSINOPHIL NFR BLD: 0.6 % (ref 0–6.9)
ERYTHROCYTE [DISTWIDTH] IN BLOOD BY AUTOMATED COUNT: 42.3 FL (ref 35.9–50)
FASTING STATUS PATIENT QL REPORTED: NORMAL
GFR SERPLBLD CREATININE-BSD FMLA CKD-EPI: 106 ML/MIN/1.73 M 2
GLOBULIN SER CALC-MCNC: 3.1 G/DL (ref 1.9–3.5)
GLUCOSE SERPL-MCNC: 82 MG/DL (ref 65–99)
HCT VFR BLD AUTO: 47.1 % (ref 37–47)
HDLC SERPL-MCNC: 45 MG/DL
HGB BLD-MCNC: 15.2 G/DL (ref 12–16)
IMM GRANULOCYTES # BLD AUTO: 0.05 K/UL (ref 0–0.11)
IMM GRANULOCYTES NFR BLD AUTO: 0.6 % (ref 0–0.9)
LDLC SERPL CALC-MCNC: 115 MG/DL
LYMPHOCYTES # BLD AUTO: 4.04 K/UL (ref 1–4.8)
LYMPHOCYTES NFR BLD: 49.9 % (ref 22–41)
MCH RBC QN AUTO: 30.9 PG (ref 27–33)
MCHC RBC AUTO-ENTMCNC: 32.3 G/DL (ref 32.2–35.5)
MCV RBC AUTO: 95.7 FL (ref 81.4–97.8)
MONOCYTES # BLD AUTO: 0.42 K/UL (ref 0–0.85)
MONOCYTES NFR BLD AUTO: 5.2 % (ref 0–13.4)
NEUTROPHILS # BLD AUTO: 3.51 K/UL (ref 1.82–7.42)
NEUTROPHILS NFR BLD: 43.3 % (ref 44–72)
NRBC # BLD AUTO: 0 K/UL
NRBC BLD-RTO: 0 /100 WBC (ref 0–0.2)
PLATELET # BLD AUTO: 196 K/UL (ref 164–446)
PMV BLD AUTO: 12.5 FL (ref 9–12.9)
POTASSIUM SERPL-SCNC: 4.2 MMOL/L (ref 3.6–5.5)
PROT SERPL-MCNC: 7.5 G/DL (ref 6–8.2)
RBC # BLD AUTO: 4.92 M/UL (ref 4.2–5.4)
SODIUM SERPL-SCNC: 140 MMOL/L (ref 135–145)
TRIGL SERPL-MCNC: 139 MG/DL (ref 0–149)
TSH SERPL DL<=0.005 MIU/L-ACNC: 2.3 UIU/ML (ref 0.38–5.33)
WBC # BLD AUTO: 8.1 K/UL (ref 4.8–10.8)

## 2023-06-15 PROCEDURE — 84443 ASSAY THYROID STIM HORMONE: CPT

## 2023-06-15 PROCEDURE — 85025 COMPLETE CBC W/AUTO DIFF WBC: CPT

## 2023-06-15 PROCEDURE — 80061 LIPID PANEL: CPT

## 2023-06-15 PROCEDURE — 36415 COLL VENOUS BLD VENIPUNCTURE: CPT

## 2023-06-15 PROCEDURE — 80053 COMPREHEN METABOLIC PANEL: CPT

## 2023-06-20 ENCOUNTER — APPOINTMENT (OUTPATIENT)
Dept: RADIOLOGY | Facility: MEDICAL CENTER | Age: 43
End: 2023-06-20
Attending: STUDENT IN AN ORGANIZED HEALTH CARE EDUCATION/TRAINING PROGRAM
Payer: MEDICAID

## 2023-08-29 DIAGNOSIS — E78.5 HYPERLIPIDEMIA, UNSPECIFIED HYPERLIPIDEMIA TYPE: ICD-10-CM

## 2023-08-30 RX ORDER — ATORVASTATIN CALCIUM 20 MG/1
TABLET, FILM COATED ORAL
Qty: 30 TABLET | Refills: 0 | Status: SHIPPED | OUTPATIENT
Start: 2023-08-30 | End: 2023-09-28

## 2023-09-13 ENCOUNTER — APPOINTMENT (OUTPATIENT)
Dept: RADIOLOGY | Facility: MEDICAL CENTER | Age: 43
End: 2023-09-13
Attending: STUDENT IN AN ORGANIZED HEALTH CARE EDUCATION/TRAINING PROGRAM
Payer: MEDICAID

## 2023-09-27 DIAGNOSIS — E78.5 HYPERLIPIDEMIA, UNSPECIFIED HYPERLIPIDEMIA TYPE: ICD-10-CM

## 2023-09-28 RX ORDER — ATORVASTATIN CALCIUM 20 MG/1
TABLET, FILM COATED ORAL
Qty: 30 TABLET | Refills: 0 | Status: SHIPPED | OUTPATIENT
Start: 2023-09-28 | End: 2023-10-30

## 2023-10-04 DIAGNOSIS — N39.41 URGE INCONTINENCE OF URINE: ICD-10-CM

## 2023-10-04 RX ORDER — OXYBUTYNIN CHLORIDE 5 MG/1
5 TABLET, EXTENDED RELEASE ORAL DAILY
Qty: 30 TABLET | Refills: 3 | Status: SHIPPED | OUTPATIENT
Start: 2023-10-04 | End: 2024-01-24

## 2023-10-20 ENCOUNTER — PHARMACY VISIT (OUTPATIENT)
Dept: PHARMACY | Facility: MEDICAL CENTER | Age: 43
End: 2023-10-20
Payer: COMMERCIAL

## 2023-10-20 PROCEDURE — RXMED WILLOW AMBULATORY MEDICATION CHARGE: Performed by: INTERNAL MEDICINE

## 2023-10-20 RX ORDER — INFLUENZA A VIRUS A/BRISBANE/02/2018 IVR-190 (H1N1) ANTIGEN (FORMALDEHYDE INACTIVATED), INFLUENZA A VIRUS A/KANSAS/14/2017 X-327 (H3N2) ANTIGEN (FORMALDEHYDE INACTIVATED), INFLUENZA B VIRUS B/PHUKET/3073/2013 ANTIGEN (FORMALDEHYDE INACTIVATED), AND INFLUENZA B VIRUS B/MARYLAND/15/2016 BX-69A ANTIGEN (FORMALDEHYDE INACTIVATED) 15; 15; 15; 15 UG/.5ML; UG/.5ML; UG/.5ML; UG/.5ML
INJECTION, SUSPENSION INTRAMUSCULAR
Qty: 0.5 ML | Refills: 0 | OUTPATIENT
Start: 2023-10-20

## 2023-10-20 RX ORDER — INFLUENZA A VIRUS A/BRISBANE/02/2018 IVR-190 (H1N1) ANTIGEN (FORMALDEHYDE INACTIVATED), INFLUENZA A VIRUS A/KANSAS/14/2017 X-327 (H3N2) ANTIGEN (FORMALDEHYDE INACTIVATED), INFLUENZA B VIRUS B/PHUKET/3073/2013 ANTIGEN (FORMALDEHYDE INACTIVATED), AND INFLUENZA B VIRUS B/MARYLAND/15/2016 BX-69A ANTIGEN (FORMALDEHYDE INACTIVATED) 15; 15; 15; 15 UG/.5ML; UG/.5ML; UG/.5ML; UG/.5ML
0.5 INJECTION, SUSPENSION INTRAMUSCULAR
Qty: 0.5 ML | Refills: 0 | OUTPATIENT
Start: 2023-10-20 | End: 2023-10-21

## 2023-10-30 DIAGNOSIS — E78.5 HYPERLIPIDEMIA, UNSPECIFIED HYPERLIPIDEMIA TYPE: ICD-10-CM

## 2023-10-30 RX ORDER — ATORVASTATIN CALCIUM 20 MG/1
TABLET, FILM COATED ORAL
Qty: 30 TABLET | Refills: 0 | Status: SHIPPED | OUTPATIENT
Start: 2023-10-30 | End: 2023-11-30

## 2023-11-29 DIAGNOSIS — E78.5 HYPERLIPIDEMIA, UNSPECIFIED HYPERLIPIDEMIA TYPE: ICD-10-CM

## 2023-11-30 RX ORDER — ATORVASTATIN CALCIUM 20 MG/1
TABLET, FILM COATED ORAL
Qty: 30 TABLET | Refills: 0 | Status: SHIPPED | OUTPATIENT
Start: 2023-11-30 | End: 2023-12-28

## 2023-12-14 NOTE — ED NOTES
Given 4 mg zofran odt pta    [facility] [phone]  [facility] [phone]     Patient was informed and referral was sent.

## 2023-12-26 DIAGNOSIS — E78.5 HYPERLIPIDEMIA, UNSPECIFIED HYPERLIPIDEMIA TYPE: ICD-10-CM

## 2023-12-28 RX ORDER — ATORVASTATIN CALCIUM 20 MG/1
TABLET, FILM COATED ORAL
Qty: 30 TABLET | Refills: 0 | Status: SHIPPED | OUTPATIENT
Start: 2023-12-28 | End: 2024-01-24

## 2024-01-24 DIAGNOSIS — N39.41 URGE INCONTINENCE OF URINE: ICD-10-CM

## 2024-01-24 DIAGNOSIS — E78.5 HYPERLIPIDEMIA, UNSPECIFIED HYPERLIPIDEMIA TYPE: ICD-10-CM

## 2024-01-24 RX ORDER — OXYBUTYNIN CHLORIDE 5 MG/1
5 TABLET, EXTENDED RELEASE ORAL DAILY
Qty: 30 TABLET | Refills: 0 | Status: SHIPPED | OUTPATIENT
Start: 2024-01-24 | End: 2024-02-27

## 2024-01-24 RX ORDER — ATORVASTATIN CALCIUM 20 MG/1
TABLET, FILM COATED ORAL
Qty: 30 TABLET | Refills: 0 | Status: SHIPPED | OUTPATIENT
Start: 2024-01-24 | End: 2024-02-27

## 2024-01-24 NOTE — TELEPHONE ENCOUNTER
Received request via: Pharmacy    Was the patient seen in the last year in this department? Yes    Does the patient have an active prescription (recently filled or refills available) for medication(s) requested? No    [facility]

## 2024-01-30 RX ORDER — IBUPROFEN 200 MG
200 TABLET ORAL EVERY 6 HOURS PRN
Qty: 30 TABLET | Refills: 2 | Status: SHIPPED | OUTPATIENT
Start: 2024-01-30

## 2024-01-30 NOTE — TELEPHONE ENCOUNTER
Received request via: Pharmacy    Was the patient seen in the last year in this department? Yes    Does the patient have an active prescription (recently filled or refills available) for medication(s) requested? No    Pharmacy Name: [facility]    Does the patient have skilled nursing Plus and need 100 day supply (blood pressure, diabetes and cholesterol meds only)? Patient does not have SCP

## 2024-02-01 ENCOUNTER — APPOINTMENT (OUTPATIENT)
Dept: RADIOLOGY | Facility: MEDICAL CENTER | Age: 44
End: 2024-02-01
Attending: STUDENT IN AN ORGANIZED HEALTH CARE EDUCATION/TRAINING PROGRAM
Payer: MEDICAID

## 2024-02-01 DIAGNOSIS — Z12.31 ENCOUNTER FOR SCREENING MAMMOGRAM FOR MALIGNANT NEOPLASM OF BREAST: ICD-10-CM

## 2024-02-01 PROCEDURE — 77067 SCR MAMMO BI INCL CAD: CPT

## 2024-02-27 DIAGNOSIS — N39.41 URGE INCONTINENCE OF URINE: ICD-10-CM

## 2024-02-27 DIAGNOSIS — R73.03 PREDIABETES: ICD-10-CM

## 2024-02-27 DIAGNOSIS — E78.5 HYPERLIPIDEMIA, UNSPECIFIED HYPERLIPIDEMIA TYPE: ICD-10-CM

## 2024-02-27 RX ORDER — ATORVASTATIN CALCIUM 20 MG/1
TABLET, FILM COATED ORAL
Qty: 30 TABLET | Refills: 0 | Status: SHIPPED | OUTPATIENT
Start: 2024-02-27 | End: 2024-03-22 | Stop reason: SDUPTHER

## 2024-02-27 RX ORDER — OXYBUTYNIN CHLORIDE 5 MG/1
5 TABLET, EXTENDED RELEASE ORAL DAILY
Qty: 30 TABLET | Refills: 0 | Status: SHIPPED | OUTPATIENT
Start: 2024-02-27 | End: 2024-03-22 | Stop reason: SDUPTHER

## 2024-02-27 NOTE — TELEPHONE ENCOUNTER
Received request via: Pharmacy    Was the patient seen in the last year in this department? Yes    Does the patient have an active prescription (recently filled or refills available) for medication(s) requested? No    Pharmacy Name: [facility], [address]    Does the patient have retirement Plus and need 100 day supply (blood pressure, diabetes and cholesterol meds only)? Patient does not have SCP

## 2024-02-27 NOTE — TELEPHONE ENCOUNTER
Received request via: Pharmacy    Was the patient seen in the last year in this department? Yes    Does the patient have an active prescription (recently filled or refills available) for medication(s) requested? No    Pharmacy Name: [facility], [address]      Does the patient have correction Plus and need 100 day supply (blood pressure, diabetes and cholesterol meds only)? Patient does not have SCP

## 2024-03-22 DIAGNOSIS — R73.03 PREDIABETES: ICD-10-CM

## 2024-03-22 DIAGNOSIS — N39.41 URGE INCONTINENCE OF URINE: ICD-10-CM

## 2024-03-22 DIAGNOSIS — E78.5 HYPERLIPIDEMIA, UNSPECIFIED HYPERLIPIDEMIA TYPE: ICD-10-CM

## 2024-03-26 RX ORDER — PSEUDOEPHED/ACETAMINOPH/DIPHEN 30MG-500MG
TABLET ORAL
Qty: 90 TABLET | Refills: 0 | Status: SHIPPED | OUTPATIENT
Start: 2024-03-26

## 2024-03-26 RX ORDER — OXYBUTYNIN CHLORIDE 5 MG/1
5 TABLET, EXTENDED RELEASE ORAL DAILY
Qty: 30 TABLET | Refills: 0 | Status: SHIPPED | OUTPATIENT
Start: 2024-03-26

## 2024-03-26 RX ORDER — ATORVASTATIN CALCIUM 20 MG/1
20 TABLET, FILM COATED ORAL EVERY EVENING
Qty: 30 TABLET | Refills: 0 | Status: SHIPPED | OUTPATIENT
Start: 2024-03-26

## 2024-04-24 DIAGNOSIS — R73.03 PREDIABETES: ICD-10-CM

## 2024-04-24 DIAGNOSIS — E78.5 HYPERLIPIDEMIA, UNSPECIFIED HYPERLIPIDEMIA TYPE: ICD-10-CM

## 2024-04-24 DIAGNOSIS — N39.41 URGE INCONTINENCE OF URINE: ICD-10-CM

## 2024-04-24 NOTE — TELEPHONE ENCOUNTER
Received request via: Pharmacy    Was the patient seen in the last year in this department? Yes    Does the patient have an active prescription (recently filled or refills available) for medication(s) requested? No    Pharmacy Name: [facility]    Does the patient have residential Plus and need 100 day supply (blood pressure, diabetes and cholesterol meds only)? Patient does not have SCP

## 2024-05-09 RX ORDER — ATORVASTATIN CALCIUM 20 MG/1
20 TABLET, FILM COATED ORAL EVERY EVENING
Qty: 30 TABLET | Refills: 0 | Status: SHIPPED | OUTPATIENT
Start: 2024-05-09 | End: 2024-05-28

## 2024-05-09 RX ORDER — OXYBUTYNIN CHLORIDE 5 MG/1
5 TABLET, EXTENDED RELEASE ORAL DAILY
Qty: 30 TABLET | Refills: 0 | Status: SHIPPED | OUTPATIENT
Start: 2024-05-09 | End: 2024-05-28

## 2024-05-21 ENCOUNTER — APPOINTMENT (OUTPATIENT)
Dept: MEDICAL GROUP | Facility: CLINIC | Age: 44
End: 2024-05-21
Payer: MEDICAID

## 2024-05-24 DIAGNOSIS — R73.03 PREDIABETES: ICD-10-CM

## 2024-05-24 DIAGNOSIS — N39.41 URGE INCONTINENCE OF URINE: ICD-10-CM

## 2024-05-24 DIAGNOSIS — E78.5 HYPERLIPIDEMIA, UNSPECIFIED HYPERLIPIDEMIA TYPE: ICD-10-CM

## 2024-05-24 NOTE — TELEPHONE ENCOUNTER
Received request via: Patient    Was the patient seen in the last year in this department? Yes    Does the patient have an active prescription (recently filled or refills available) for medication(s) requested? No    Pharmacy Name: [facility]    Does the patient have shelter Plus and need 100 day supply (blood pressure, diabetes and cholesterol meds only)? Patient does not have SCP

## 2024-05-28 RX ORDER — OXYBUTYNIN CHLORIDE 5 MG/1
5 TABLET, EXTENDED RELEASE ORAL DAILY
Qty: 30 TABLET | Refills: 0 | Status: SHIPPED | OUTPATIENT
Start: 2024-05-28

## 2024-05-28 RX ORDER — ATORVASTATIN CALCIUM 20 MG/1
20 TABLET, FILM COATED ORAL EVERY EVENING
Qty: 30 TABLET | Refills: 0 | Status: SHIPPED | OUTPATIENT
Start: 2024-05-28

## 2024-05-31 ENCOUNTER — APPOINTMENT (OUTPATIENT)
Dept: MEDICAL GROUP | Facility: CLINIC | Age: 44
End: 2024-05-31
Payer: MEDICAID

## 2024-06-07 ENCOUNTER — APPOINTMENT (OUTPATIENT)
Dept: MEDICAL GROUP | Facility: CLINIC | Age: 44
End: 2024-06-07
Payer: MEDICAID

## 2024-06-07 VITALS
TEMPERATURE: 96.7 F | WEIGHT: 253.6 LBS | BODY MASS INDEX: 38.43 KG/M2 | HEART RATE: 111 BPM | SYSTOLIC BLOOD PRESSURE: 116 MMHG | OXYGEN SATURATION: 94 % | HEIGHT: 68 IN | DIASTOLIC BLOOD PRESSURE: 80 MMHG

## 2024-06-07 DIAGNOSIS — L68.0 HIRSUTISM: ICD-10-CM

## 2024-06-07 DIAGNOSIS — Z12.12 SCREENING FOR COLORECTAL CANCER: ICD-10-CM

## 2024-06-07 DIAGNOSIS — M1A.00X0 IDIOPATHIC CHRONIC GOUT WITHOUT TOPHUS, UNSPECIFIED SITE: ICD-10-CM

## 2024-06-07 DIAGNOSIS — R06.83 SNORING: ICD-10-CM

## 2024-06-07 DIAGNOSIS — Z90.722 H/O OF HYSTERECTOMY WITH BILATERAL OOPHORECTOMY: ICD-10-CM

## 2024-06-07 DIAGNOSIS — G89.29 CHRONIC PAIN OF LEFT KNEE: ICD-10-CM

## 2024-06-07 DIAGNOSIS — Z13.228 ENCOUNTER FOR SCREENING FOR METABOLIC DISORDER: ICD-10-CM

## 2024-06-07 DIAGNOSIS — Z12.11 SCREENING FOR COLORECTAL CANCER: ICD-10-CM

## 2024-06-07 DIAGNOSIS — R35.0 URINARY FREQUENCY: ICD-10-CM

## 2024-06-07 DIAGNOSIS — Z90.710 H/O OF HYSTERECTOMY WITH BILATERAL OOPHORECTOMY: ICD-10-CM

## 2024-06-07 DIAGNOSIS — M25.562 CHRONIC PAIN OF LEFT KNEE: ICD-10-CM

## 2024-06-07 DIAGNOSIS — M79.89 SWELLING OF LOWER EXTREMITY: ICD-10-CM

## 2024-06-07 PROCEDURE — 99214 OFFICE O/P EST MOD 30 MIN: CPT | Mod: GC

## 2024-06-07 ASSESSMENT — FIBROSIS 4 INDEX: FIB4 SCORE: 0.87

## 2024-06-07 NOTE — PROGRESS NOTES
[facility]     PATIENT ID:  NAME:  Lidia Salcedo  MRN:               [id number]  YOB: 1980    Date: 12:25 PM      Resident: Thiago Barros M.D.    CC: Multiple concerns      HPI: Lidia Salcedo is a 44 y.o. female who presented with caregivers who states the patient has multiple concerns.    No problems updated.    REVIEW OF SYSTEMS:   Ten systems reviewed and were negative except as noted in the HPI.                PROBLEM LIST  Patient Active Problem List   Diagnosis    Pelvic mass    Acute respiratory failure with hypoxia (HCC)    COVID-19    Mental developmental delay    Urinary tract infection, acute    Psychiatric problem    Morbid obesity with body mass index of 40.0-44.9 in adult (HCC)    Fatigue    Chronic pain of both knees    Other microscopic hematuria    Hyperlipidemia    Prediabetes    Acne    Cellulitis    Dandruff    Elevated blood pressure reading without diagnosis of hypertension    Hx of hysterectomy    Impacted cerumen of right ear    Insect bites    Moderate intellectual disability    Unspecified hearing loss, right ear    Uterine leiomyoma    Bipolar affective disorder (HCC)    Obesity with body mass index 30 or greater    Mood disorder (HCC)    Obesity    Tinea corporis    Urge incontinence of urine        PAST SURGICAL HISTORY:  Past Surgical History:   Procedure Laterality Date    HYSTERECTOMY ROBOTIC XI  2/12/2015    Procedure: RIGHT SALPINGECTOMY;  Surgeon: Donald Damon M.D.;  Location: SURGERY [facility];  Service:     OTHER      cyst removed, ovary removed       FAMILY HISTORY:  No family history on file.    SOCIAL HISTORY:   Social History     Tobacco Use    Smoking status: Never    Smokeless tobacco: Never   Substance Use Topics    Alcohol use: No       ALLERGIES:  Allergies   Allergen Reactions    Nkda [No Known Drug Allergy]        OUTPATIENT MEDICATIONS:    Current Outpatient Medications:     atorvastatin (LIPITOR) 20 MG Tab, TAKE 1  "TABLET BY MOUTH EVERY EVENING, Disp: 30 Tablet, Rfl: 0    Acetaminophen Extra Strength 500 MG Tab, TAKE 1 TO 2 TABLETS BY MOUTH EVERY 6 HOURS AS NEEDED, Disp: 90 Tablet, Rfl: 0    ibuprofen (MOTRIN) 200 MG Tab, Take 1 Tablet by mouth every 6 hours as needed for Mild Pain., Disp: 30 Tablet, Rfl: 2    ketoconazole (NIZORAL) 2 % shampoo, USE 5 ML TO WASH ONCE A DAY AS NEEDED FOR ITCHING FOR UP TO 24 DAYS Strength: 2 %, Disp: 120 mL, Rfl: 0    citalopram (CELEXA) 40 MG Tab, Take 40 mg by mouth every day., Disp: , Rfl:     clonazePAM (KLONOPIN) 0.5 MG Tab, Take 0.5 mg by mouth 3 times a day., Disp: , Rfl:     risperiDONE (RISPERDAL) 0.5 MG Tab, , Disp: , Rfl:     DULoxetine (CYMBALTA) 60 MG Cap DR Particles delayed-release capsule, , Disp: , Rfl:     divalproex (DEPAKOTE SPRINKLE) 125 MG Capsule Delayed Release Sprinkle, , Disp: , Rfl:     Multiple Vitamins-Minerals (CEROVITE PO), Take 1 Tab by mouth every morning., Disp: , Rfl:     metFORMIN (GLUCOPHAGE) 500 MG Tab, TAKE 1 TABLET BY MOUTH 2 TIMES DAILY WITH MEALS, Disp: 60 Tablet, Rfl: 0    oxybutynin SR (DITROPAN-XL) 5 MG TABLET SR 24 HR, TAKE 1 TABLET BY MOUTH 1 TIME DAILY, Disp: 30 Tablet, Rfl: 0    influenza vaccine quad (FLUZONE QUADRIVALENT) 0.5 ML Suspension Prefilled Syringe injection, Inject  into the shoulder, thigh, or buttocks., Disp: 0.5 mL, Rfl: 0    PHYSICAL EXAM:  Vitals:    06/07/24 1125   BP: 116/80   BP Location: Left arm   Patient Position: Sitting   BP Cuff Size: Adult   Pulse: (!) 111   Temp: 35.9 °C (96.7 °F)   TempSrc: Temporal   SpO2: 94%   Weight: 115 kg (253 lb 9.6 oz)   Height: 1.727 m (5' 8\")       General: Pt resting in NAD, cooperative   Skin:  Pink, warm and dry.  HEENT: NC/AT. EOMI.  Lungs:  Symmetrical.  CTAB, good air movement   Cardiovascular:  S1/S2 RRR   Abdomen:  Abdomen is soft, nontender  Extremities:  Full range of motion.  Mild to moderate lower extremity swelling at this time with trace pitting edema.  Symmetric " "bilaterally.  CNS:  Muscle tone is normal. No gross focal neurologic deficits      ASSESSMENT/PLAN:   44 y.o. female who presents to clinic with caregivers and multiple concerns.      1. Encounter for screening for metabolic disorder  Patient presents to clinic and has not had laboratories performed and approximately 1 year, given significantly elevated BMI and recent weight gain well performed metabolic lab work at this time.  - Comp Metabolic Panel; Future  - TSH WITH REFLEX TO FT4; Future  - Lipid Profile; Future    2. Chronic pain of left knee  Patient reports history of chronic left lower extremity knee pain.  Her caregivers note that the patient does seem to experience some pain while walking which is mostly located in her left knee.  She currently wears a supportive brace with some improvement but the patient's current brace does not seem to fit her left knee very well as it is very tight.  The patient notes that she would prefer a knee brace that is larger and also would like it\" Jung Mouse red color\".  Caregivers report that the patient has not had any trauma, falls or injury and also report no other associated symptoms with her pain.  Referral for physical therapy placed at this time.  Will also place DME order for knee brace and offered x-ray given patient's inability to explain symptoms due to her chronic medical conditions will evaluate with an x-ray.  - Knee Brace  - Referral to Physical Therapy  - DX-KNEE COMPLETE 4+ LEFT; Future    3. Snoring  Caregivers report the patient has a longstanding history of snoring.  They do note a decrease in her energy level over the past few months.  Given her elevated body habitus, BMI and neck circumference as well as STOP-BANG score of approximately 3-4 will order for sleep study at this time and repeat CBC.  - Referral to Pulmonary and Sleep Medicine  - CBC WITH DIFFERENTIAL; Future    4. Urinary frequency  Caregivers note that the patient has a history of " frequent incontinence.  They do note that this seems to be slightly improved recently but states that the patient has had frequent UTIs in the past and would like urinalysis for evaluation.  Patient is unable to describe any dysuria or pain at this time.  Will order urinalysis for further evaluation.  - URINALYSIS,CULTURE IF INDICATED; Future    5. Swelling of lower extremity  Caregivers report that the patient has bilateral lower extremity swelling over the past few months.  They note it is more significant than seasonal changes associated lower extremity swelling the patient experienced in the past.  They note this seems to be compounded by her recent weight gain.  On examination there is mild lower extremity swelling with trace pitting.  Patient has not had an echocardiogram in the past.  Will order for echocardiogram at this time.  - EC-ECHOCARDIOGRAM COMPLETE W/O CONT; Future    6. Idiopathic chronic gout without tophus, unspecified site  Caregivers report the patient has a past medical history of gout and is not on any medications for management other than for breakthrough pain.  They note that the patient has had some swelling in bilateral lower extremities and is unsure if this is related to gout.  On examination there is no evidence of hot, swollen or erythematous joints, there is no tenderness to palpation of toes to bilateral feet, ankle, knees, hips or bilateral hands.  Patient does not appear to be experiencing gout flare at this time, counseled patient and caregivers on gout management and if continued concerns will discuss starting allopurinol.  - URIC ACID, SERUM    7. Screening for colorectal cancer  Patient has never had screening for colorectal cancer, will order referral to GI for colonoscopy at this time.  - Referral to GI for Colonoscopy    8. Hirsutism  Caregivers report that the patient has had significant facial and upper neck and terminal hair growth.  They note that they commonly pluck  these hairs but they appear to be thick and large like male hair.  Patient has a history of hysterectomy with bilateral oophorectomy.  At this time we will provide hormonal testing for evaluation of hormone status and consideration of treatment if continuing to be troublesome for patient.  - FSH; Future  - LUTEINIZING HORMONE SERUM; Future  - ESTRADIOL; Future  - TESTOSTERONE SERUM; Future  - Referral to OB/Gyn    10. H/O of hysterectomy with bilateral oophorectomy  Patient has history of hysterectomy with bilateral oophorectomy.  Per caregivers it is unknown when the patient's last Pap smear was.  Caregivers also note that the patient was planned to be started on hormone therapy after surgery but this has not been started in multiple years.  They note the patient was prescribed these medications but they were not taking and they are unsure if the patient should be restarted on the medications at this time.  Counseled patient and caregiver on risks and benefits of hormone therapy.  Given patient's chronic conditions would likely benefit from female provider, will provide referral to OB/GYN at this time for further discussion.  - FSH; Future  - LUTEINIZING HORMONE SERUM; Future  - ESTRADIOL; Future  - TESTOSTERONE SERUM; Future  - Referral to OB/Gyn    11.  GERD  Per caregivers patient does appear to have some sensations of reflux.  On examination patient does not have any tenderness to palpation epigastric region.  Counseled patient and caregivers on management of reflux.  As patient is on multiple medications at this time and behavioral health is making changes to current treatment regimen they will discuss addition of additional medications to her regimen with behavioral health team before deciding for medical treatment of GERD.      Thiago Barros M.D.  PGY-3  [facility]

## 2024-06-17 ENCOUNTER — HOSPITAL ENCOUNTER (OUTPATIENT)
Facility: MEDICAL CENTER | Age: 44
End: 2024-06-17
Payer: MEDICAID

## 2024-06-17 LAB
APPEARANCE UR: ABNORMAL
BACTERIA #/AREA URNS HPF: ABNORMAL /HPF
BILIRUB UR QL STRIP.AUTO: NEGATIVE
COLOR UR: YELLOW
EPI CELLS #/AREA URNS HPF: ABNORMAL /HPF
GLUCOSE UR STRIP.AUTO-MCNC: NEGATIVE MG/DL
HYALINE CASTS #/AREA URNS LPF: ABNORMAL /LPF
KETONES UR STRIP.AUTO-MCNC: ABNORMAL MG/DL
LEUKOCYTE ESTERASE UR QL STRIP.AUTO: ABNORMAL
MICRO URNS: ABNORMAL
NITRITE UR QL STRIP.AUTO: NEGATIVE
PH UR STRIP.AUTO: 5.5 [PH] (ref 5–8)
PROT UR QL STRIP: NEGATIVE MG/DL
RBC # URNS HPF: ABNORMAL /HPF
RBC UR QL AUTO: ABNORMAL
SP GR UR STRIP.AUTO: 1.02
UROBILINOGEN UR STRIP.AUTO-MCNC: 0.2 MG/DL
WBC #/AREA URNS HPF: ABNORMAL /HPF

## 2024-06-17 PROCEDURE — 87086 URINE CULTURE/COLONY COUNT: CPT

## 2024-06-17 PROCEDURE — 81001 URINALYSIS AUTO W/SCOPE: CPT

## 2024-06-20 LAB
BACTERIA UR CULT: NORMAL
SIGNIFICANT IND 70042: NORMAL
SITE SITE: NORMAL
SOURCE SOURCE: NORMAL

## 2024-06-25 DIAGNOSIS — E78.5 HYPERLIPIDEMIA, UNSPECIFIED HYPERLIPIDEMIA TYPE: ICD-10-CM

## 2024-06-25 DIAGNOSIS — R73.03 PREDIABETES: ICD-10-CM

## 2024-06-25 NOTE — TELEPHONE ENCOUNTER
Received request via: Patient    Was the patient seen in the last year in this department? Yes    Does the patient have an active prescription (recently filled or refills available) for medication(s) requested? No    Pharmacy Name: [email or web address]    Does the patient have group home Plus and need 100 day supply (blood pressure, diabetes and cholesterol meds only)? No

## 2024-06-27 RX ORDER — ATORVASTATIN CALCIUM 20 MG/1
20 TABLET, FILM COATED ORAL EVERY EVENING
Qty: 30 TABLET | Refills: 0 | Status: SHIPPED | OUTPATIENT
Start: 2024-06-27

## 2024-07-02 ENCOUNTER — APPOINTMENT (OUTPATIENT)
Dept: RADIOLOGY | Facility: MEDICAL CENTER | Age: 44
End: 2024-07-02
Payer: MEDICAID

## 2024-07-04 DIAGNOSIS — M25.561 CHRONIC PAIN OF BOTH KNEES: ICD-10-CM

## 2024-07-04 DIAGNOSIS — G89.29 CHRONIC PAIN OF BOTH KNEES: ICD-10-CM

## 2024-07-04 DIAGNOSIS — M25.562 CHRONIC PAIN OF BOTH KNEES: ICD-10-CM

## 2024-07-12 ENCOUNTER — APPOINTMENT (OUTPATIENT)
Dept: CARDIOLOGY | Facility: MEDICAL CENTER | Age: 44
End: 2024-07-12
Payer: MEDICAID

## 2024-07-24 ENCOUNTER — HOSPITAL ENCOUNTER (OUTPATIENT)
Dept: RADIOLOGY | Facility: MEDICAL CENTER | Age: 44
End: 2024-07-24
Payer: MEDICAID

## 2024-07-24 DIAGNOSIS — M25.562 CHRONIC PAIN OF LEFT KNEE: ICD-10-CM

## 2024-07-24 DIAGNOSIS — G89.29 CHRONIC PAIN OF RIGHT KNEE: ICD-10-CM

## 2024-07-24 DIAGNOSIS — M25.561 CHRONIC PAIN OF RIGHT KNEE: ICD-10-CM

## 2024-07-24 DIAGNOSIS — G89.29 CHRONIC PAIN OF LEFT KNEE: ICD-10-CM

## 2024-07-24 PROCEDURE — 73562 X-RAY EXAM OF KNEE 3: CPT | Mod: RT

## 2024-07-31 ENCOUNTER — ANCILLARY PROCEDURE (OUTPATIENT)
Dept: CARDIOLOGY | Facility: MEDICAL CENTER | Age: 44
End: 2024-07-31
Payer: MEDICAID

## 2024-07-31 DIAGNOSIS — M79.89 SWELLING OF LOWER EXTREMITY: ICD-10-CM

## 2024-07-31 LAB — LV EJECT FRACT  99904: 55

## 2024-07-31 PROCEDURE — 93325 DOPPLER ECHO COLOR FLOW MAPG: CPT

## 2024-07-31 PROCEDURE — 93325 DOPPLER ECHO COLOR FLOW MAPG: CPT | Mod: 26 | Performed by: INTERNAL MEDICINE

## 2024-07-31 PROCEDURE — 93308 TTE F-UP OR LMTD: CPT | Mod: 26 | Performed by: INTERNAL MEDICINE

## 2024-08-01 DIAGNOSIS — E78.5 HYPERLIPIDEMIA, UNSPECIFIED HYPERLIPIDEMIA TYPE: ICD-10-CM

## 2024-08-01 DIAGNOSIS — R73.03 PREDIABETES: ICD-10-CM

## 2024-08-01 RX ORDER — ATORVASTATIN CALCIUM 20 MG/1
20 TABLET, FILM COATED ORAL EVERY EVENING
Qty: 90 TABLET | Refills: 3 | Status: SHIPPED | OUTPATIENT
Start: 2024-08-01

## 2024-08-01 NOTE — TELEPHONE ENCOUNTER
Received request via: Pharmacy    Was the patient seen in the last year in this department? Yes    Does the patient have an active prescription (recently filled or refills available) for medication(s) requested? No    Pharmacy Name: [facility]    Does the patient have [facility] and need 100 day supply (blood pressure, diabetes and cholesterol meds only)? Patient does not have SCP

## 2024-09-03 ENCOUNTER — APPOINTMENT (OUTPATIENT)
Dept: MEDICAL GROUP | Facility: CLINIC | Age: 44
End: 2024-09-03
Payer: MEDICAID

## 2024-09-03 VITALS
SYSTOLIC BLOOD PRESSURE: 118 MMHG | DIASTOLIC BLOOD PRESSURE: 80 MMHG | OXYGEN SATURATION: 95 % | HEART RATE: 90 BPM | HEIGHT: 68 IN | WEIGHT: 252 LBS | BODY MASS INDEX: 38.19 KG/M2 | TEMPERATURE: 96.8 F

## 2024-09-03 DIAGNOSIS — M17.11 PRIMARY OSTEOARTHRITIS OF RIGHT KNEE: ICD-10-CM

## 2024-09-03 DIAGNOSIS — Z71.2 ENCOUNTER TO DISCUSS X-RAY RESULTS: ICD-10-CM

## 2024-09-03 DIAGNOSIS — Z71.2 ENCOUNTER TO DISCUSS TEST RESULTS: ICD-10-CM

## 2024-09-03 PROCEDURE — 99213 OFFICE O/P EST LOW 20 MIN: CPT | Mod: GE

## 2024-09-03 RX ORDER — MELOXICAM 7.5 MG/1
15 TABLET ORAL DAILY
Qty: 90 TABLET | Refills: 1 | Status: SHIPPED | OUTPATIENT
Start: 2024-09-03

## 2024-09-03 ASSESSMENT — FIBROSIS 4 INDEX: FIB4 SCORE: 0.87

## 2024-09-03 NOTE — PROGRESS NOTES
CC:Diagnoses of Primary osteoarthritis of right knee, Encounter to discuss x-ray results, and Encounter to discuss test results were pertinent to this visit.    HISTORY OF PRESENT ILLNESS: Patient is a 44 y.o. female established patient who presents today for the following:    Problem   Encounter to Discuss X-Ray Results  Primary Osteoarthritis of Right Knee    Patient presents to clinic with her caregiver to discuss x-rays that were taken of her right knee due to chronic right knee pain.  During last visit patient was told to wear a knee brace and PT was ordered.  Caregiver reports that PT will start on 9/24/2024.  Patient has not however been wearing her knee brace.  Patient continues to complain of right knee pain.     Encounter to Discuss Test Results    Patient presents to clinic with her caregiver to discuss echocardiogram results.        Patient Active Problem List    Diagnosis Date Noted    Encounter to discuss x-ray results 09/03/2024    Encounter to discuss test results 09/03/2024    Primary osteoarthritis of right knee 09/03/2024    Urge incontinence of urine 10/16/2022    Tinea corporis 07/14/2022    Moderate intellectual disability 06/28/2022    Uterine leiomyoma 06/28/2022    Other microscopic hematuria 05/12/2022    Hyperlipidemia 05/12/2022    Fatigue 04/07/2022    Cellulitis 06/10/2021    Obesity with body mass index 30 or greater 04/29/2021    Acute respiratory failure with hypoxia ([facility]) 12/19/2020    COVID-19 12/19/2020    Mental developmental delay     Psychiatric problem     Morbid obesity with body mass index of 40.0-44.9 in adult ([facility])     Prediabetes 02/13/2020    Elevated blood pressure reading without diagnosis of hypertension 01/28/2020    Impacted cerumen of right ear 01/28/2020    Unspecified hearing loss, right ear 01/28/2020    Mood disorder (HCC) 01/28/2020    Obesity 01/28/2020    Urinary tract infection, acute 08/07/2019    Hx of hysterectomy 11/07/2018    Chronic pain of both  knees 05/09/2018    Insect bites 08/19/2015    Acne 06/03/2015    Dandruff 06/03/2015    Pelvic mass 02/12/2015    Bipolar affective disorder (HCC) 08/20/2014      Allergies:Nkda [no known drug allergy]    Current Outpatient Medications   Medication Sig Dispense Refill    diclofenac sodium (VOLTAREN) 1 % Gel Apply 2 g topically 4 times a day as needed (apply to right knee as needed for pain). 350 g 2    meloxicam (MOBIC) 7.5 MG Tab Take 2 Tablets by mouth every day. 90 Tablet 1    atorvastatin (LIPITOR) 20 MG Tab Take 1 Tablet by mouth every evening. 90 Tablet 3    metFORMIN (GLUCOPHAGE) 500 MG Tab Take 1 Tablet by mouth 2 times a day with meals. 180 Tablet 3    oxybutynin SR (DITROPAN-XL) 5 MG TABLET SR 24 HR TAKE 1 TABLET BY MOUTH 1 TIME DAILY 30 Tablet 0    Acetaminophen Extra Strength 500 MG Tab TAKE 1 TO 2 TABLETS BY MOUTH EVERY 6 HOURS AS NEEDED 90 Tablet 0    influenza vaccine quad (FLUZONE QUADRIVALENT) 0.5 ML Suspension Prefilled Syringe injection Inject  into the shoulder, thigh, or buttocks. 0.5 mL 0    ketoconazole (NIZORAL) 2 % shampoo USE 5 ML TO WASH ONCE A DAY AS NEEDED FOR ITCHING FOR UP TO 24 DAYS Strength: 2 % 120 mL 0    citalopram (CELEXA) 40 MG Tab Take 40 mg by mouth every day.      clonazePAM (KLONOPIN) 0.5 MG Tab Take 0.5 mg by mouth 3 times a day.      risperiDONE (RISPERDAL) 0.5 MG Tab       DULoxetine (CYMBALTA) 60 MG Cap DR Particles delayed-release capsule       divalproex (DEPAKOTE SPRINKLE) 125 MG Capsule Delayed Release Sprinkle       Multiple Vitamins-Minerals (CEROVITE PO) Take 1 Tab by mouth every morning.       No current facility-administered medications for this visit.     Social History     Tobacco Use    Smoking status: Never    Smokeless tobacco: Never   Vaping Use    Vaping status: Never Used   Substance Use Topics    Alcohol use: No    Drug use: No     Social History     Social History Narrative    Not on file     History reviewed. No pertinent family history.    Exam:   "  /80 (BP Location: Left arm, Patient Position: Sitting, BP Cuff Size: Adult)   Pulse 90   Temp 36 °C (96.8 °F) (Temporal)   Ht 1.722 m (5' 7.8\")   Wt 114 kg (252 lb)   SpO2 95%  Body mass index is 38.55 kg/m².    General:  Well nourished, well developed female in NAD  HENT: Atraumatic, normocephalic  EYES: Extraocular movements intact, pupils equal and reactive to light  NECK: Supple, FROM  CHEST: No deformities, Equal chest expansion  RESP: Unlabored, no stridor or audible wheeze  ABD: Non-Distended  Extremities: Patient has slight limp with her gait when bearing weight on right leg, right knee swollen without erythema or signs of infection  Skin: Warm/dry, without rashes  Neuro: A/O x 4, CN 2-12 Grossly intact, Motor/sensory grossly intact  Psych: Normal behavior, normal affect    LABS: Results reviewed and discussed with the patient, questions answered.    ECHO   7/31/2024  CONCLUSIONS  Technically difficult study.  Normal left ventricular systolic function. Visually estimated ejection   fraction is 55-60 %.   Normal right ventricular size and systolic function.  No prior study is available for comparison.     RT Knee xray  7/24/2024  IMPRESSION:     Right knee osteoarthritis    ASSESSMENT/PLAN:    Encounter to discuss test results  Echocardiogram performed on 7/31/2024 showed EF of 55-60 with normal left ventricular systolic function and right ventricular size and systolic function.    Encounter to discuss x-ray results  44-year-old female with tree of mental developmental delay and moderate intellectual disability presents to clinic with caregiver for results of x-rays of right knee.  Patient not currently using knee brace.  PT scheduled to start 9/24/2024.  PE: Patient has slight limp with her gait when bearing weight on right leg, right knee swollen without erythema or signs of infection.  X-ray notable for osteoarthritis.    Plan  - Recommend patient start wearing her knee brace to provide " additional support to knee  - Encouraged that patient scheduled to start PT on 9/24/2024.  This will help with patient's mobility and improve sedentary lifestyle.  - Recommended start meloxicam 15 mg daily to help with inflammation.  Discontinued Motrin  - Recommended using Voltaren gel to help relieve pain and inflammation to be applied 4 times daily as needed  - Follow-up in 1 month    Return in about 1 month (around 10/3/2024).    Ramona Field MD PGY3

## 2024-09-04 NOTE — ASSESSMENT & PLAN NOTE
Echocardiogram performed on 7/31/2024 showed EF of 55-60 with normal left ventricular systolic function and right ventricular size and systolic function.

## 2024-09-04 NOTE — ASSESSMENT & PLAN NOTE
44-year-old female with tree of mental developmental delay and moderate intellectual disability presents to clinic with caregiver for results of x-rays of right knee.  Patient not currently using knee brace.  PT scheduled to start 9/24/2024.  PE: Patient has slight limp with her gait when bearing weight on right leg, right knee swollen without erythema or signs of infection.  X-ray notable for osteoarthritis.    Plan  - Recommend patient start wearing her knee brace to provide additional support to knee  - Encouraged that patient scheduled to start PT on 9/24/2024.  This will help with patient's mobility and improve sedentary lifestyle.  - Recommended start meloxicam 15 mg daily to help with inflammation.  Discontinued Motrin  - Recommended using Voltaren gel to help relieve pain and inflammation to be applied 4 times daily as needed  - Follow-up in 1 month

## 2024-09-11 ENCOUNTER — OFFICE VISIT (OUTPATIENT)
Dept: URGENT CARE | Facility: PHYSICIAN GROUP | Age: 44
End: 2024-09-11
Payer: MEDICAID

## 2024-09-11 VITALS
OXYGEN SATURATION: 93 % | HEART RATE: 114 BPM | SYSTOLIC BLOOD PRESSURE: 116 MMHG | BODY MASS INDEX: 34.89 KG/M2 | HEIGHT: 70 IN | RESPIRATION RATE: 24 BRPM | WEIGHT: 243.72 LBS | TEMPERATURE: 97.2 F | DIASTOLIC BLOOD PRESSURE: 78 MMHG

## 2024-09-11 DIAGNOSIS — K04.7 DENTAL INFECTION: ICD-10-CM

## 2024-09-11 DIAGNOSIS — K08.89 PAIN, DENTAL: ICD-10-CM

## 2024-09-11 PROCEDURE — 3074F SYST BP LT 130 MM HG: CPT

## 2024-09-11 PROCEDURE — 3078F DIAST BP <80 MM HG: CPT

## 2024-09-11 PROCEDURE — 99214 OFFICE O/P EST MOD 30 MIN: CPT

## 2024-09-11 RX ORDER — ACETAMINOPHEN 500 MG
500-1000 TABLET ORAL EVERY 6 HOURS PRN
Qty: 30 TABLET | Refills: 0 | Status: SHIPPED | OUTPATIENT
Start: 2024-09-11

## 2024-09-11 ASSESSMENT — FIBROSIS 4 INDEX: FIB4 SCORE: 0.87

## 2024-09-11 ASSESSMENT — ENCOUNTER SYMPTOMS: FEVER: 0

## 2024-09-11 NOTE — PROGRESS NOTES
Subjective:     CHIEF COMPLAINT  Chief Complaint   Patient presents with    Oral Swelling     LT sided dental swelling and pain x1 day. Staff first reported the pain yesterday and pt woke up w/ swelling today.        HPI  Lidia Salcedo is a very pleasant 44 y.o. female who presents assisted by a staff member from her assisted living home with left-sided dental pain with cheek swelling.  She started to complain of dental pain yesterday.  When she awoke this morning, the staff at her facility noticed that her left cheek appeared swollen.  They are in the process of making an appointment with her dentist.  She has not had any fevers and is otherwise feeling well.    REVIEW OF SYSTEMS  Review of Systems   Constitutional:  Negative for fever.       PAST MEDICAL HISTORY  Patient Active Problem List    Diagnosis Date Noted    Encounter to discuss x-ray results 09/03/2024    Encounter to discuss test results 09/03/2024    Primary osteoarthritis of right knee 09/03/2024    Urge incontinence of urine 10/16/2022    Tinea corporis 07/14/2022    Moderate intellectual disability 06/28/2022    Uterine leiomyoma 06/28/2022    Other microscopic hematuria 05/12/2022    Hyperlipidemia 05/12/2022    Fatigue 04/07/2022    Cellulitis 06/10/2021    Obesity with body mass index 30 or greater 04/29/2021    Acute respiratory failure with hypoxia ([facility]) 12/19/2020    COVID-19 12/19/2020    Mental developmental delay     Psychiatric problem     Morbid obesity with body mass index of 40.0-44.9 in adult ([facility])     Prediabetes 02/13/2020    Elevated blood pressure reading without diagnosis of hypertension 01/28/2020    Impacted cerumen of right ear 01/28/2020    Unspecified hearing loss, right ear 01/28/2020    Mood disorder (HCC) 01/28/2020    Obesity 01/28/2020    Urinary tract infection, acute 08/07/2019    Hx of hysterectomy 11/07/2018    Chronic pain of both knees 05/09/2018    Insect bites 08/19/2015    Acne 06/03/2015    Dandruff  "06/03/2015    Pelvic mass 02/12/2015    Bipolar affective disorder (HCC) 08/20/2014       SURGICAL HISTORY   has a past surgical history that includes other and hysterectomy robotic xi (2/12/2015).    ALLERGIES  Allergies   Allergen Reactions    Nkda [No Known Drug Allergy]        CURRENT MEDICATIONS  Home Medications       Reviewed by Honey Sutherland P.A.-C. (Physician Assistant) on 09/11/24 at 0937  Med List Status: <None>     Medication Last Dose Status   Acetaminophen Extra Strength 500 MG Tab PRN Active   atorvastatin (LIPITOR) 20 MG Tab Not Taking Active   citalopram (CELEXA) 40 MG Tab Taking Active   clonazePAM (KLONOPIN) 0.5 MG Tab Taking Active   diclofenac sodium (VOLTAREN) 1 % Gel Not Taking Active   divalproex (DEPAKOTE SPRINKLE) 125 MG Capsule Delayed Release Sprinkle Taking Active   DULoxetine (CYMBALTA) 60 MG Cap DR Particles delayed-release capsule Taking Active   influenza vaccine quad (FLUZONE QUADRIVALENT) 0.5 ML Suspension Prefilled Syringe injection Not Taking Active   ketoconazole (NIZORAL) 2 % shampoo PRN Active   meloxicam (MOBIC) 7.5 MG Tab Not Taking Active   metFORMIN (GLUCOPHAGE) 500 MG Tab Not Taking Active   Multiple Vitamins-Minerals (CEROVITE PO) Not Taking Active   oxybutynin SR (DITROPAN-XL) 5 MG TABLET SR 24 HR Not Taking Active   risperiDONE (RISPERDAL) 0.5 MG Tab Taking Active                    SOCIAL HISTORY  Social History     Tobacco Use    Smoking status: Never    Smokeless tobacco: Never   Vaping Use    Vaping status: Never Used   Substance and Sexual Activity    Alcohol use: No    Drug use: No    Sexual activity: Not on file       FAMILY HISTORY  History reviewed. No pertinent family history.       Objective:     VITAL SIGNS: /78 (BP Location: Right arm, Patient Position: Sitting, BP Cuff Size: Large adult)   Pulse (!) 114   Temp 36.2 °C (97.2 °F) (Temporal)   Resp (!) 24   Ht 1.778 m (5' 10\") Comment: PT reported  Wt 111 kg (243 lb 11.5 oz)   LMP " 01/19/2015   SpO2 93%   BMI 34.97 kg/m²     PHYSICAL EXAM  Physical Exam  Vitals reviewed. Exam conducted with a chaperone present.   Constitutional:       General: She is not in acute distress.     Appearance: Normal appearance. She is not ill-appearing or toxic-appearing.   HENT:      Head: Normocephalic and atraumatic.      Mouth/Throat:      Mouth: Mucous membranes are moist.      Dentition: Dental tenderness and gingival swelling present. No dental caries or dental abscesses.      Pharynx: Oropharynx is clear. Uvula midline.      Tonsils: No tonsillar exudate or tonsillar abscesses.        Comments: Gingival swelling left upper gumline.  No dental abscess at this time.  No visible dental caries.  Area of swelling is tender to palpation without fluctuance.    Swelling present on left cheek without erythema.  Eyes:      Conjunctiva/sclera: Conjunctivae normal.      Pupils: Pupils are equal, round, and reactive to light.   Cardiovascular:      Rate and Rhythm: Normal rate.   Pulmonary:      Effort: Pulmonary effort is normal. No respiratory distress.   Skin:     General: Skin is warm and dry.   Neurological:      General: No focal deficit present.      Mental Status: She is alert and oriented to person, place, and time.   Psychiatric:         Mood and Affect: Mood normal.         Assessment/Plan:     1. Dental infection  - amoxicillin-clavulanate (AUGMENTIN) 875-125 MG Tab; Take 1 Tablet by mouth 2 times a day for 7 days.  Dispense: 14 Tablet; Refill: 0  - acetaminophen (TYLENOL) 500 MG Tab; Take 1-2 Tablets by mouth every 6 hours as needed for Moderate Pain.  Dispense: 30 Tablet; Refill: 0  -Warm salt water swishes after meals  -Follow-up with dentist ASAP  -Return to clinic if symptoms worsen prior to seeing dentist    MDM/Comments:  Patient is displaying systemic symptoms including tachycardia and tachypnea on physical examination. These symptoms are likely contributed to a dental infection. I have prepared  for this visit by personally reviewing the patient's prevous medical records, vitals, and labs including: most recent GFR. Patient has stable vital signs and is non-toxic appearing.  Patient initiated on Augmentin to be taken twice daily for 7 days for her dental infection.  There is no evidence of dental abscess at this time, although I am concerned that 1 may be developing given the swelling on the gumline.  Patient provided a prescription of Tylenol to be taken as needed for pain.  Discussed the importance of following up with a dentist in a timely manner.  Patient and guardian demonstrated understanding of treatment plan at this time and will RTC if symptoms worsen or fail to resolve.       Differential diagnosis, natural history, supportive care, and indications for immediate follow-up discussed. All questions answered. Patient agrees with the plan of care.    Follow-up as needed if symptoms worsen or fail to improve to PCP, Urgent care or Emergency Room.    I have personally reviewed prior external notes and test results pertinent to today's visit.  I have independently reviewed and interpreted all diagnostics ordered during this urgent care acute visit.   Discussed management options (risks,benefits, and alternatives to treatment). Pt expresses understanding and the treatment plan was agreed upon. Questions were encouraged and answered to pt's satisfaction.    Please note that this dictation was created using voice recognition software. I have made a reasonable attempt to correct obvious errors, but I expect that there are errors of grammar and possibly content that I did not discover before finalizing the note.

## 2024-10-08 ENCOUNTER — APPOINTMENT (OUTPATIENT)
Dept: PHYSICAL THERAPY | Facility: REHABILITATION | Age: 44
End: 2024-10-08
Payer: MEDICAID

## 2024-10-10 ENCOUNTER — APPOINTMENT (OUTPATIENT)
Dept: PHYSICAL THERAPY | Facility: REHABILITATION | Age: 44
End: 2024-10-10
Payer: MEDICAID

## 2024-10-15 ENCOUNTER — APPOINTMENT (OUTPATIENT)
Dept: PHYSICAL THERAPY | Facility: REHABILITATION | Age: 44
End: 2024-10-15
Payer: MEDICAID

## 2024-10-17 ENCOUNTER — APPOINTMENT (OUTPATIENT)
Dept: PHYSICAL THERAPY | Facility: REHABILITATION | Age: 44
End: 2024-10-17
Payer: MEDICAID

## 2024-10-17 ENCOUNTER — OFFICE VISIT (OUTPATIENT)
Dept: URGENT CARE | Facility: PHYSICIAN GROUP | Age: 44
End: 2024-10-17
Payer: MEDICAID

## 2024-10-17 VITALS
RESPIRATION RATE: 20 BRPM | OXYGEN SATURATION: 93 % | SYSTOLIC BLOOD PRESSURE: 112 MMHG | BODY MASS INDEX: 37.59 KG/M2 | HEIGHT: 68 IN | WEIGHT: 248 LBS | TEMPERATURE: 97.3 F | HEART RATE: 124 BPM | DIASTOLIC BLOOD PRESSURE: 80 MMHG

## 2024-10-17 DIAGNOSIS — H61.21 EXCESSIVE CERUMEN IN EAR CANAL, RIGHT: ICD-10-CM

## 2024-10-17 DIAGNOSIS — S00.411A EAR ABRASION, RIGHT, INITIAL ENCOUNTER: ICD-10-CM

## 2024-10-17 PROCEDURE — 3079F DIAST BP 80-89 MM HG: CPT | Performed by: PHYSICIAN ASSISTANT

## 2024-10-17 PROCEDURE — 99213 OFFICE O/P EST LOW 20 MIN: CPT | Mod: 25 | Performed by: PHYSICIAN ASSISTANT

## 2024-10-17 PROCEDURE — 3074F SYST BP LT 130 MM HG: CPT | Performed by: PHYSICIAN ASSISTANT

## 2024-10-17 PROCEDURE — 69210 REMOVE IMPACTED EAR WAX UNI: CPT | Mod: RT | Performed by: PHYSICIAN ASSISTANT

## 2024-10-17 ASSESSMENT — ENCOUNTER SYMPTOMS
CHILLS: 0
FEVER: 0

## 2024-10-17 ASSESSMENT — FIBROSIS 4 INDEX: FIB4 SCORE: 0.87

## 2024-10-18 ENCOUNTER — APPOINTMENT (OUTPATIENT)
Dept: MEDICAL GROUP | Facility: CLINIC | Age: 44
End: 2024-10-18
Payer: MEDICAID

## 2024-10-22 ENCOUNTER — APPOINTMENT (OUTPATIENT)
Dept: MEDICAL GROUP | Facility: CLINIC | Age: 44
End: 2024-10-22
Payer: MEDICAID

## 2024-10-22 ENCOUNTER — APPOINTMENT (OUTPATIENT)
Dept: PHYSICAL THERAPY | Facility: REHABILITATION | Age: 44
End: 2024-10-22
Payer: MEDICAID

## 2024-10-22 VITALS
HEIGHT: 68 IN | BODY MASS INDEX: 37.59 KG/M2 | SYSTOLIC BLOOD PRESSURE: 114 MMHG | RESPIRATION RATE: 18 BRPM | DIASTOLIC BLOOD PRESSURE: 68 MMHG | HEART RATE: 70 BPM | OXYGEN SATURATION: 98 % | TEMPERATURE: 98 F | WEIGHT: 248 LBS

## 2024-10-22 DIAGNOSIS — M17.11 PRIMARY OSTEOARTHRITIS OF RIGHT KNEE: ICD-10-CM

## 2024-10-22 PROCEDURE — 99213 OFFICE O/P EST LOW 20 MIN: CPT | Mod: GE

## 2024-10-22 ASSESSMENT — FIBROSIS 4 INDEX: FIB4 SCORE: 0.87

## 2024-10-23 DIAGNOSIS — M17.11 PRIMARY OSTEOARTHRITIS OF RIGHT KNEE: ICD-10-CM

## 2024-10-23 NOTE — TELEPHONE ENCOUNTER
Received request via: Pharmacy    Was the patient seen in the last year in this department? Yes    Does the patient have an active prescription (recently filled or refills available) for medication(s) requested? No    Pharmacy Name: [facility]    Does the patient have nursing home Plus and need 100-day supply? (This applies to ALL medications) Patient does not have SCP

## 2024-10-24 ENCOUNTER — APPOINTMENT (OUTPATIENT)
Dept: PHYSICAL THERAPY | Facility: REHABILITATION | Age: 44
End: 2024-10-24
Payer: MEDICAID

## 2024-10-29 ENCOUNTER — APPOINTMENT (OUTPATIENT)
Dept: PHYSICAL THERAPY | Facility: REHABILITATION | Age: 44
End: 2024-10-29
Payer: MEDICAID

## 2024-10-31 ENCOUNTER — APPOINTMENT (OUTPATIENT)
Dept: PHYSICAL THERAPY | Facility: REHABILITATION | Age: 44
End: 2024-10-31
Payer: MEDICAID

## 2024-11-01 RX ORDER — MELOXICAM 15 MG/1
15 TABLET ORAL DAILY
Qty: 30 TABLET | Refills: 0 | Status: SHIPPED | OUTPATIENT
Start: 2024-11-01 | End: 2024-11-21

## 2024-11-05 ENCOUNTER — APPOINTMENT (OUTPATIENT)
Dept: PHYSICAL THERAPY | Facility: REHABILITATION | Age: 44
End: 2024-11-05
Payer: MEDICAID

## 2024-11-07 ENCOUNTER — APPOINTMENT (OUTPATIENT)
Dept: PHYSICAL THERAPY | Facility: REHABILITATION | Age: 44
End: 2024-11-07
Payer: MEDICAID

## 2024-11-19 DIAGNOSIS — M17.11 PRIMARY OSTEOARTHRITIS OF RIGHT KNEE: ICD-10-CM

## 2024-11-19 NOTE — TELEPHONE ENCOUNTER
Received request via: Pharmacy    Was the patient seen in the last year in this department? Yes    Does the patient have an active prescription (recently filled or refills available) for medication(s) requested? No    Pharmacy Name:   [id number], [address]       Does the patient have skilled nursing Plus and need 100-day supply? (This applies to ALL medications) Patient does not have SCP

## 2024-11-21 DIAGNOSIS — M17.11 PRIMARY OSTEOARTHRITIS OF RIGHT KNEE: ICD-10-CM

## 2024-11-21 RX ORDER — MELOXICAM 15 MG/1
15 TABLET ORAL DAILY
Qty: 30 TABLET | Refills: 0 | Status: SHIPPED | OUTPATIENT
Start: 2024-11-21 | End: 2024-11-22

## 2024-11-21 NOTE — TELEPHONE ENCOUNTER
Received request via: Pharmacy    Was the patient seen in the last year in this department? Yes    Does the patient have an active prescription (recently filled or refills available) for medication(s) requested? No    Pharmacy Name: [facility], [address]     Does the patient have halfway Plus and need 100-day supply? (This applies to ALL medications) Patient does not have SCP

## 2024-11-22 RX ORDER — MELOXICAM 15 MG/1
15 TABLET ORAL DAILY
Qty: 30 TABLET | Refills: 0 | Status: SHIPPED | OUTPATIENT
Start: 2024-11-22

## 2024-12-20 ENCOUNTER — OFFICE VISIT (OUTPATIENT)
Dept: SLEEP MEDICINE | Facility: MEDICAL CENTER | Age: 44
End: 2024-12-20
Payer: MEDICAID

## 2024-12-20 VITALS
SYSTOLIC BLOOD PRESSURE: 128 MMHG | HEART RATE: 96 BPM | BODY MASS INDEX: 38.19 KG/M2 | OXYGEN SATURATION: 96 % | HEIGHT: 68 IN | RESPIRATION RATE: 16 BRPM | WEIGHT: 252 LBS | DIASTOLIC BLOOD PRESSURE: 84 MMHG

## 2024-12-20 DIAGNOSIS — G47.33 OSA (OBSTRUCTIVE SLEEP APNEA): ICD-10-CM

## 2024-12-20 PROCEDURE — 3079F DIAST BP 80-89 MM HG: CPT | Performed by: STUDENT IN AN ORGANIZED HEALTH CARE EDUCATION/TRAINING PROGRAM

## 2024-12-20 PROCEDURE — 99212 OFFICE O/P EST SF 10 MIN: CPT | Performed by: STUDENT IN AN ORGANIZED HEALTH CARE EDUCATION/TRAINING PROGRAM

## 2024-12-20 PROCEDURE — 3074F SYST BP LT 130 MM HG: CPT | Performed by: STUDENT IN AN ORGANIZED HEALTH CARE EDUCATION/TRAINING PROGRAM

## 2024-12-20 PROCEDURE — 99204 OFFICE O/P NEW MOD 45 MIN: CPT | Performed by: STUDENT IN AN ORGANIZED HEALTH CARE EDUCATION/TRAINING PROGRAM

## 2024-12-20 ASSESSMENT — FIBROSIS 4 INDEX: FIB4 SCORE: 0.87

## 2024-12-20 NOTE — PROGRESS NOTES
[facility] Consult Note     Date: 12/20/2024 / Time: 10:44 AM      Thank you for requesting a sleep medicine consultation on Lidia Salcedo at the sleep center. Presents today with the   Chief Complaint   Patient presents with    New Patient     Ref by Dr. Barros Dx: Snoring    Is Pt currently on PAP/O2? NO     Any prior Sleep Studies? NO.    Previously seen with [facility]? NO        She is referred by Thiago Barros M.D.  [address] [place] [address] for evaluation and treatment of sleep disorder breathing evaluation.     HISTORY OF PRESENT ILLNESS:     Lidia Salcedo is a 44 y.o postmenopausal female with moderate intellectual disability with metabolic syndrome, GERD, chronic gout, chronic pain, hypertension, BPD, s/p hysterectomy and bilateral oophorectomy who presents to Sleep Clinic for sleep disordered breathing evaluation.     Comes in today with her caregiver. Much of the history obtained by her caregiver given her intellectual disability. She lives in an assisted living home.   She has some fatigue and sleepiness during the day. Has snoring/nocturia during the night     Pertinent meds: Klonopin 0.5 mg 3 times daily, duloxetine 60 mg, Risperdal 0.5 mg    As per supplemental questionnaire to be scanned or imported into chart:    [place] Sleepiness Score: 13    Sleep Schedule  Bedtime: 8:30 pm  Wake time: 7 am  Sleep-onset latency: within minutes  Awakenings from sleep: ~2x nocturia  Difficulty falling back asleep: no  Bedroom partner: no  Naps: Yes, 1-2x/wk    DAYTIME SYMPTOMS:   Excessive daytime sleepiness: Yes  Daytime fatigue: Yes  Difficulty concentrating: Yes  Memory problems: Yes  Irritability:Yes  Work/school performance issues: No   Sleepiness with driving: No   Caffeine/stimulant use: No   Alcohol use:No     SLEEP RELATED SYMPTOMS  Snoring: Yes  Witnessed apnea or gasping/choking: No   Dry mouth or mouth breathing: Yes  Sweating: No   Teeth grinding/biting:  "No   Morning headaches: No   Refreshed Upon Awakening: Yes     SLEEP RELATED BEHAVIORS:  Parasomnias (walking, talking, eating, violence): No   Leg kicking: No   Restless legs - \"urge to move\": No   Nightmares: No  Recurrent: No   Dream enactment: No      NARCOLEPSY:  Cataplexy: No   Sleep paralysis: No   Sleep attacks: No   Hypnagogic/hypnopompic hallucinations: No     MEDICAL HISTORY  Past Medical History:   Diagnosis Date    Mental developmental delay     pt mentally \"6 years old\"    Other specified symptom associated with female genital organs     Unspecified urinary incontinence         SURGICAL HISTORY  Past Surgical History:   Procedure Laterality Date    HYSTERECTOMY ROBOTIC XI  2/12/2015    Procedure: RIGHT SALPINGECTOMY;  Surgeon: Donald Damon M.D.;  Location: SURGERY [facility];  Service:     OTHER      cyst removed, ovary removed        FAMILY HISTORY  No family history on file.    SOCIAL HISTORY  Social History     Socioeconomic History    Marital status: Single   Tobacco Use    Smoking status: Never    Smokeless tobacco: Never   Vaping Use    Vaping status: Never Used   Substance and Sexual Activity    Alcohol use: No    Drug use: No        Occupation: unemployed    CURRENT MEDICATIONS  Current Outpatient Medications   Medication Sig    meloxicam (MOBIC) 15 MG tablet TAKE 1 TABLET BY MOUTH 1 TIME DAILY    acetaminophen (TYLENOL) 500 MG Tab Take 1-2 Tablets by mouth every 6 hours as needed for Moderate Pain.    diclofenac sodium (VOLTAREN) 1 % Gel Apply 2 g topically 4 times a day as needed (apply to right knee as needed for pain). (Patient not taking: Reported on 9/11/2024)    atorvastatin (LIPITOR) 20 MG Tab Take 1 Tablet by mouth every evening. (Patient not taking: Reported on 9/11/2024)    metFORMIN (GLUCOPHAGE) 500 MG Tab Take 1 Tablet by mouth 2 times a day with meals. (Patient not taking: Reported on 9/11/2024)    oxybutynin SR (DITROPAN-XL) 5 MG TABLET SR 24 HR TAKE 1 TABLET BY MOUTH 1 TIME " DAILY (Patient not taking: Reported on 9/11/2024)    influenza vaccine quad (FLUZONE QUADRIVALENT) 0.5 ML Suspension Prefilled Syringe injection Inject  into the shoulder, thigh, or buttocks. (Patient not taking: Reported on 9/11/2024)    ketoconazole (NIZORAL) 2 % shampoo USE 5 ML TO WASH ONCE A DAY AS NEEDED FOR ITCHING FOR UP TO 24 DAYS Strength: 2 %    citalopram (CELEXA) 40 MG Tab Take 40 mg by mouth every day.    clonazePAM (KLONOPIN) 0.5 MG Tab Take 0.5 mg by mouth 3 times a day.    risperiDONE (RISPERDAL) 0.5 MG Tab     DULoxetine (CYMBALTA) 60 MG Cap DR Particles delayed-release capsule     divalproex (DEPAKOTE SPRINKLE) 125 MG Capsule Delayed Release Sprinkle     Multiple Vitamins-Minerals (CEROVITE PO) Take 1 Tab by mouth every morning. (Patient not taking: Reported on 9/11/2024)       REVIEW OF SYSTEMS  Constitutional: Denies fevers, Denies weight changes  Ears/Nose/Throat/Mouth: Denies nasal congestion or sore throat   Cardiovascular: Denies chest pain  Respiratory: Denies shortness of breath, Denies cough  Gastrointestinal/Hepatic: Denies nausea, vomiting  Sleep: see HPI    Physical Examination:  Vitals/ General Appearance:   Weight/BMI: There is no height or weight on file to calculate BMI.  There were no vitals filed for this visit.    Pt. is alert and oriented to time, place and person. Cooperative and in no apparent distress.     Constitutional: Alert, no distress, well-groomed.  Skin: No rashes in visible areas.  Eye: Round. Conjunctiva clear, lids normal. No icterus.   ENT EXAM  Nasal alae/valves collapsible: No   Nasal septum deviation: No   Nasal turbinate hypertrophy: Left: Grade 2   Right: Grade 2  Hard palate narrow: Yes  Hard palate high: Yes  Soft palate/uvula (Mallampati score): 4  Tongue Scalloping: Yes  Retrognathia: Yes  Micrognathia: No   Cardiovascular: Tachycardic but regular rhythm  Pulmonary: Clear to auscultation  Neurologic: Alert and interactive however unaware of situation due  "to underlying developmental delay  Extremities: No clubbing, cyanosis, or edema     Bicarb:   Lab Results   Component Value Date/Time    CO2 22 06/15/2023 1043    CO2 25 08/17/2022 1520    CO2 21 05/04/2022 0645     TSH:   Lab Results   Component Value Date/Time    TSHULTRASEN 2.300 06/15/2023 1043     CREATININE:   Lab Results   Component Value Date/Time    CREATININE 0.72 06/15/2023 1043     VIT D: No results found for: \"25HYDROXY\"  H/H:  Lab Results   Component Value Date/Time    HEMOGLOBIN 15.2 06/15/2023 10:43 AM     Echocardiogram 7/31/2024:  CONCLUSIONS  Technically difficult study.  Normal left ventricular systolic function. Visually estimated ejection   fraction is 55-60 %.   Normal right ventricular size and systolic function.  No prior study is available for comparison.     ASSESSMENT AND PLAN   1. Lidia Salcedo  has symptoms of Obstructive Sleep Apnea (FREYA). Lidia Salcedo has symptoms of snoring, choking/gasping during sleep, dry mouth, unrefreshed upon awakening. These  interfere with activities of daily living. ESS 13  Pt has risk factors for FREYA include obesity, thick neck, and crowded oropharynx.     The pathophysiology of FREYA and the increased risk of cardiovascular morbidity from untreated FREYA is discussed in detail with the patient. She  also has HTN, metabolic syndrome which can be worsened by FREYA.  Patient also at risk for central sleep apnea based on medications and developmental delay.  Plan  -  She will be scheduled for an overnight PSG to assess sleep related breathing disorder with possible split to initiate appropriate PAP therapy  -Discussed plan with caregiver to ensure proper follow-up  - Follow up 1-2 weeks after sleep study to discuss results and treatment options moving forward   -Advised to reach out via MyChart or by phone with any questions or concerns.     2.  Regarding treatment of other past medical problems and general health maintenance,  Pt is urged to follow up " with PCP.      Please note portions of this record was created using voice recognition software. I have made every reasonable attempt to correct obvious errors, but I expect that there are errors of grammar and possibly content I did not discover before finalizing the note.

## 2024-12-27 DIAGNOSIS — M17.11 PRIMARY OSTEOARTHRITIS OF RIGHT KNEE: ICD-10-CM

## 2024-12-28 NOTE — TELEPHONE ENCOUNTER
Received request via: Pharmacy    Was the patient seen in the last year in this department? Yes    Does the patient have an active prescription (recently filled or refills available) for medication(s) requested? No    Pharmacy Name: [facility], [address] [[id number]]     Does the patient have long term Plus and need 100-day supply? (This applies to ALL medications) Patient does not have SCP

## 2024-12-30 RX ORDER — MELOXICAM 15 MG/1
15 TABLET ORAL DAILY
Qty: 30 TABLET | Refills: 0 | Status: SHIPPED | OUTPATIENT
Start: 2024-12-30

## 2025-01-17 ENCOUNTER — HOSPITAL ENCOUNTER (OUTPATIENT)
Facility: MEDICAL CENTER | Age: 45
End: 2025-01-17
Payer: MEDICAID

## 2025-01-17 PROCEDURE — 87086 URINE CULTURE/COLONY COUNT: CPT

## 2025-01-19 LAB
BACTERIA UR CULT: NORMAL
SIGNIFICANT IND 70042: NORMAL
SITE SITE: NORMAL
SOURCE SOURCE: NORMAL

## 2025-01-22 DIAGNOSIS — M17.11 PRIMARY OSTEOARTHRITIS OF RIGHT KNEE: ICD-10-CM

## 2025-01-22 NOTE — TELEPHONE ENCOUNTER
Received request via: Pharmacy    Was the patient seen in the last year in this department? Yes    Does the patient have an active prescription (recently filled or refills available) for medication(s) requested? No    Pharmacy Name: [facility], [address]     Does the patient have skilled nursing Plus and need 100-day supply? (This applies to ALL medications) Patient does not have SCP

## 2025-01-23 RX ORDER — MELOXICAM 15 MG/1
15 TABLET ORAL DAILY
Qty: 30 TABLET | Refills: 0 | Status: SHIPPED | OUTPATIENT
Start: 2025-01-23

## 2025-01-28 ENCOUNTER — APPOINTMENT (OUTPATIENT)
Dept: MEDICAL GROUP | Facility: CLINIC | Age: 45
End: 2025-01-28
Payer: MEDICAID

## 2025-02-07 ENCOUNTER — APPOINTMENT (OUTPATIENT)
Dept: SLEEP MEDICINE | Facility: MEDICAL CENTER | Age: 45
End: 2025-02-07
Attending: STUDENT IN AN ORGANIZED HEALTH CARE EDUCATION/TRAINING PROGRAM
Payer: MEDICAID

## 2025-02-07 DIAGNOSIS — G47.33 OSA (OBSTRUCTIVE SLEEP APNEA): ICD-10-CM

## 2025-02-07 PROCEDURE — 95810 POLYSOM 6/> YRS 4/> PARAM: CPT | Performed by: STUDENT IN AN ORGANIZED HEALTH CARE EDUCATION/TRAINING PROGRAM

## 2025-02-10 NOTE — PROCEDURES
Patient: BEATRICE GUTIERREZ  ID: [id number] Date: 2/7/2025 Exam No.:   MONTAGE: Standard  STUDY TYPE: Diagnostic  RECORDING TECHNIQUE:   After the scalp was prepared, gold plated electrodes were applied to the scalp according to the International 10-20 System. EEG (electroencephalogram) was continuously monitored from the O1-M2, O2-M1, C3-M2, C4-M1, F3-M2, and F4-M1. EOGs (electrooculograms) were monitored by electrodes placed at the left and right outer canthi. Chin EMG (electromyogram) was monitored by electrodes placed on the mentalis and sub-mentalis muscles. Nasal and oral airflow were monitored using a triple port thermocouple as well as oronasal pressure transducer. Respiratory effort was measured by inductive plethysmography technology employing abdominal and thoracic belts. Blood oxygen saturation and pulse were monitored by pulse oximetry. Heart rhythm was monitored by surface electrocardiogram. Leg EMG was studied using surface electrodes placed on left and right anterior tibialis. A microphone was used to monitor tracheal sounds and snoring. Body position was monitored and documented by technician observation.   SCORING CRITERIA:   A modification of the AASM manual for scoring of sleep and associated events was used. Obstructive apneas were scored by cessation of airflow for at least 10 seconds with continuing respiratory effort. Central apneas were scored by cessation of airflow for at least 10 seconds with no respiratory effort. Hypopneas were scored by a 30% or more reduction in airflow for at least 10 seconds accompanied by arterial oxygen desaturation of 3% or an arousal. For CMS (Medicare) patients, per AASM rule 1B, hypopneas are scored by 30% with mild reduction in airflow for at least 10 seconds accompanied by arterial saturation decreased at 4%.  Study start time was 08:20:17 PM. Diagnostic recording time was 9h 14.5m with a total sleep time of 2h 42.5m resulting in a sleep efficiency of 29.31%%.  Sleep latency from the start of the study was 187 minutes and the latency from sleep to REM was 275 minutes. In total,38 arousals were scored for an arousal index of 14.0.  Respiratory:  There were a total of 3 apneas consisting of 2 obstructive apneas, 0 mixed apneas, and 1 central apneas. A total of 105 hypopneas were scored. The apnea index was 1.11 per hour and the hypopnea index was 38.77 per hour resulting in an overall AHI of 39.88. AHI during REM was 56.0 and AHI while supine was 39.88.  Oximetry:  There was a mean oxygen saturation of 92.0%. The minimum oxygen saturation in NREM was 84.0 % and in REM was 85.0%. The patient spent 6.2 minutes of TST with SaO2 <88%.  Cardiac:  The highest heart rate seen while awake was 102 BPM while the highest heart rate during sleep was 85 BPM with an average sleeping heart rate of 79 BPM.  Limb Movements:  There were a total of 0 PLMs during sleep which resulted in a PLMS index of 0.0. Of these, 0 were associated with arousals which resulted in a PLMS arousal index of 0.0.  Assessment:   Severe obstructive Sleep Apnea Hypopnea - AHI (4% ) 39.88 with nocturnal desaturation - dahlia saturation 84% - saturations <88% below for 6.2 minutes of TST.  Impression:  Prolonged SOL, increased WASO and poor sleep efficiency with TST:  2h 42.5m  Patient did not meet criteria for split-night protocol based on reduced TST prior to 2 am  Supine sleep seen  Little amount of supine REM seen during diagnostic study  FREYA worse during REM sleep (REM AHI: 56.0)   Patient had enuresis  Recommendation:   Would recommend CPAP/BiPAP PSG titration given severity of FREYA if patient amenable

## 2025-02-11 ENCOUNTER — OFFICE VISIT (OUTPATIENT)
Dept: MEDICAL GROUP | Facility: CLINIC | Age: 45
End: 2025-02-11
Payer: MEDICAID

## 2025-02-11 VITALS
HEIGHT: 69 IN | DIASTOLIC BLOOD PRESSURE: 83 MMHG | TEMPERATURE: 97.4 F | WEIGHT: 254.8 LBS | BODY MASS INDEX: 37.74 KG/M2 | OXYGEN SATURATION: 92 % | HEART RATE: 105 BPM | SYSTOLIC BLOOD PRESSURE: 116 MMHG

## 2025-02-11 DIAGNOSIS — M25.561 CHRONIC PAIN OF RIGHT KNEE: ICD-10-CM

## 2025-02-11 DIAGNOSIS — G89.29 CHRONIC PAIN OF RIGHT KNEE: ICD-10-CM

## 2025-02-11 DIAGNOSIS — Z23 NEED FOR VACCINATION: ICD-10-CM

## 2025-02-11 DIAGNOSIS — M17.11 PRIMARY OSTEOARTHRITIS OF RIGHT KNEE: ICD-10-CM

## 2025-02-11 PROCEDURE — 90471 IMMUNIZATION ADMIN: CPT | Performed by: FAMILY MEDICINE

## 2025-02-11 PROCEDURE — 90656 IIV3 VACC NO PRSV 0.5 ML IM: CPT | Performed by: FAMILY MEDICINE

## 2025-02-11 PROCEDURE — 99213 OFFICE O/P EST LOW 20 MIN: CPT | Mod: 25,GE

## 2025-02-11 RX ORDER — MICONAZOLE NITRATE 20 MG/G
CREAM TOPICAL 2 TIMES DAILY
COMMUNITY

## 2025-02-11 RX ORDER — MIRABEGRON 25 MG/1
TABLET, FILM COATED, EXTENDED RELEASE ORAL DAILY
COMMUNITY

## 2025-02-11 ASSESSMENT — FIBROSIS 4 INDEX: FIB4 SCORE: 0.87

## 2025-02-11 NOTE — ASSESSMENT & PLAN NOTE
Osteoarthritis of right knee  Currently taking meloxicam 15 mg daily and Voltaren gel  Knee brace during the day, off at night  Able to ambulate  No recent falls    Plan  - Referral to physical therapy placed in October, not started  - Continue meloxicam 50 mg daily and Voltaren gel for pain  - Encouraged daily walks  - Continue eating a diet rich in fruits and vegetables and decreasing sweets and fats  - Follow-up appointment in 3 months

## 2025-02-11 NOTE — PROGRESS NOTES
[facility]    Subjective:     CC: Follow-up on right knee pain    HPI:   Lidia is a 44 y.o. female with:    Problem   Primary Osteoarthritis of Right Knee    Last Visit:  Patient presents with caregiver.  History of right knee pain, x-ray showed evidence of osteoarthritis.  Caregiver uncertain if patient has been compliant with her knee brace or medications.  Caregiver also uncertain if patient is starting physical therapy but will ask .  Patient reports that the pain in her knees is better.  Patient does not know where her knee braces.  Patient is able to ambulate.  No reports of falls.    Today's Visit:  Patient presents to clinic with her caregiver.  Patient is now wearing her knee brace during the day and takes it off to sleep.  She is using meloxicam and Voltaren gel for pain.  Caregiver reports that she is being taken for walks daily.  There have been changes to her diet to incorporate more fruits and vegetables and decrease junk food.  Denies any recent falls.  Is ambulating without difficulty.  Has not done physical therapy. No concerns or questions     Chronic Pain of Right Knee (Resolved)       Current Outpatient Medications Ordered in Epic   Medication Sig Dispense Refill    miconazole (MICOTIN) 2 % Cream Apply  topically 2 times a day.      mirabegron ER (MYRBETRIQ) 25 MG TABLET SR 24 HR Take  by mouth every day.      meloxicam (MOBIC) 15 MG tablet TAKE 1 TABLET BY MOUTH 1 TIME DAILY 30 Tablet 0    acetaminophen (TYLENOL) 500 MG Tab Take 1-2 Tablets by mouth every 6 hours as needed for Moderate Pain. 30 Tablet 0    diclofenac sodium (VOLTAREN) 1 % Gel Apply 2 g topically 4 times a day as needed (apply to right knee as needed for pain). 350 g 2    atorvastatin (LIPITOR) 20 MG Tab Take 1 Tablet by mouth every evening. 90 Tablet 3    metFORMIN (GLUCOPHAGE) 500 MG Tab Take 1 Tablet by mouth 2 times a day with meals. 180 Tablet 3    ketoconazole (NIZORAL) 2 % shampoo USE 5 ML  "TO WASH ONCE A DAY AS NEEDED FOR ITCHING FOR UP TO 24 DAYS Strength: 2 % 120 mL 0    citalopram (CELEXA) 40 MG Tab Take 40 mg by mouth every day.      clonazePAM (KLONOPIN) 0.5 MG Tab Take 0.5 mg by mouth 3 times a day.      risperiDONE (RISPERDAL) 0.5 MG Tab       DULoxetine (CYMBALTA) 60 MG Cap DR Particles delayed-release capsule       oxybutynin SR (DITROPAN-XL) 5 MG TABLET SR 24 HR TAKE 1 TABLET BY MOUTH 1 TIME DAILY (Patient not taking: Reported on 9/11/2024) 30 Tablet 0    influenza vaccine quad (FLUZONE QUADRIVALENT) 0.5 ML Suspension Prefilled Syringe injection Inject  into the shoulder, thigh, or buttocks. (Patient not taking: Reported on 9/11/2024) 0.5 mL 0    divalproex (DEPAKOTE SPRINKLE) 125 MG Capsule Delayed Release Sprinkle       Multiple Vitamins-Minerals (CEROVITE PO) Take 1 Tab by mouth every morning. (Patient not taking: Reported on 9/11/2024)       No current [facility]-ordered facility-administered medications on file.     ROS:  Negative except as noted in HPI    Objective:     Exam:  /83 (BP Location: Left arm, Patient Position: Sitting, BP Cuff Size: Adult)   Pulse (!) 105   Temp 36.3 °C (97.4 °F) (Temporal)   Ht 1.753 m (5' 9\")   Wt 116 kg (254 lb 12.8 oz)   LMP 01/19/2015   SpO2 92%   BMI 37.63 kg/m²  Body mass index is 37.63 kg/m².      Physical Exam  Constitutional:       General: She is not in acute distress.     Appearance: She is not ill-appearing.   HENT:      Mouth/Throat:      Mouth: Mucous membranes are moist.   Eyes:      Extraocular Movements: Extraocular movements intact.      Pupils: Pupils are equal, round, and reactive to light.   Cardiovascular:      Rate and Rhythm: Normal rate and regular rhythm.      Heart sounds: Normal heart sounds. No murmur heard.  Pulmonary:      Effort: Pulmonary effort is normal. No respiratory distress.      Breath sounds: Normal breath sounds. No wheezing.   Abdominal:      General: There is no distension.      Palpations: Abdomen is " soft.      Tenderness: There is no abdominal tenderness.   Musculoskeletal:      Cervical back: Normal range of motion.      Right knee: No swelling or deformity. Decreased range of motion. Tenderness present.   Skin:     General: Skin is warm.   Neurological:      Mental Status: She is alert.       Labs:     Results for orders placed or performed during the hospital encounter of 01/17/25   URINE CULTURE(NEW)    Collection Time: 01/17/25  4:50 PM    Specimen: Urine   Result Value Ref Range    Significant Indicator NEG     Source UR     Site -     Culture Result Usual urogenital cisco ,000 cfu/mL      Assessment & Plan:     44 y.o. female with the following -     Problem List Items Addressed This Visit       RESOLVED: Chronic pain of right knee    Primary osteoarthritis of right knee     Osteoarthritis of right knee  Currently taking meloxicam 15 mg daily and Voltaren gel  Knee brace during the day, off at night  Able to ambulate  No recent falls    Plan  - Referral to physical therapy placed in October, not started  - Continue meloxicam 50 mg daily and Voltaren gel for pain  - Encouraged daily walks  - Continue eating a diet rich in fruits and vegetables and decreasing sweets and fats  - Follow-up appointment in 3 months          Other Visit Diagnoses       Need for vaccination        Relevant Orders    INFLUENZA VACCINE TRI INJ (PF)  (Completed)            Return in about 3 months (around 5/11/2025).      I advised the patient that I will contact them with all lab, imaging, or procedure results within a week of having the test performed. If they have not received a message/call from our clinic during that expected time period, they are advised to contact our clinic to ensure that the labs/imaging studies were received by this office.     Ramona Field MD  [facility]  PGY-3

## 2025-02-18 DIAGNOSIS — M17.11 PRIMARY OSTEOARTHRITIS OF RIGHT KNEE: ICD-10-CM

## 2025-02-19 NOTE — TELEPHONE ENCOUNTER
Received request via: Pharmacy    Was the patient seen in the last year in this department? Yes    Does the patient have an active prescription (recently filled or refills available) for medication(s) requested? No    Pharmacy Name: [facility], [address] [[id number]]     Does the patient have custodial Plus and need 100-day supply? (This applies to ALL medications) Patient does not have SCP

## 2025-02-21 RX ORDER — MELOXICAM 15 MG/1
15 TABLET ORAL DAILY
Qty: 30 TABLET | Refills: 0 | Status: SHIPPED | OUTPATIENT
Start: 2025-02-21

## 2025-02-25 DIAGNOSIS — B35.0: ICD-10-CM

## 2025-02-25 RX ORDER — KETOCONAZOLE 20 MG/ML
SHAMPOO, SUSPENSION TOPICAL
Qty: 120 ML | Refills: 0 | Status: SHIPPED | OUTPATIENT
Start: 2025-02-25

## 2025-03-19 DIAGNOSIS — B35.0: ICD-10-CM

## 2025-03-19 DIAGNOSIS — M17.11 PRIMARY OSTEOARTHRITIS OF RIGHT KNEE: ICD-10-CM

## 2025-03-20 RX ORDER — MELOXICAM 15 MG/1
15 TABLET ORAL DAILY
Qty: 30 TABLET | Refills: 0 | Status: SHIPPED | OUTPATIENT
Start: 2025-03-20

## 2025-03-20 RX ORDER — KETOCONAZOLE 20 MG/ML
SHAMPOO, SUSPENSION TOPICAL
Qty: 120 ML | Refills: 0 | Status: SHIPPED | OUTPATIENT
Start: 2025-03-20

## 2025-03-20 NOTE — TELEPHONE ENCOUNTER
Received request via: Pharmacy    Was the patient seen in the last year in this department? Yes    Does the patient have an active prescription (recently filled or refills available) for medication(s) requested? No    Pharmacy Name: [facility], [address]

## 2025-04-16 DIAGNOSIS — M17.11 PRIMARY OSTEOARTHRITIS OF RIGHT KNEE: ICD-10-CM

## 2025-04-17 NOTE — TELEPHONE ENCOUNTER
Received request via: Patient    Was the patient seen in the last year in this department? Yes    Does the patient have an active prescription (recently filled or refills available) for medication(s) requested? No    Pharmacy Name: [facility], [address]     Does the patient have assisted Plus and need 100-day supply? (This applies to ALL medications) Patient does not have SCP

## 2025-04-18 RX ORDER — MELOXICAM 15 MG/1
15 TABLET ORAL DAILY
Qty: 30 TABLET | Refills: 0 | Status: SHIPPED | OUTPATIENT
Start: 2025-04-18

## 2025-05-20 DIAGNOSIS — M17.11 PRIMARY OSTEOARTHRITIS OF RIGHT KNEE: ICD-10-CM

## 2025-05-20 RX ORDER — MELOXICAM 15 MG/1
15 TABLET ORAL DAILY
Qty: 30 TABLET | Refills: 0 | Status: SHIPPED | OUTPATIENT
Start: 2025-05-20

## 2025-05-20 NOTE — TELEPHONE ENCOUNTER
Received request via: Pharmacy    Was the patient seen in the last year in this department? Yes    Does the patient have an active prescription (recently filled or refills available) for medication(s) requested? No    Pharmacy Name:  [facility], [address]     Does the patient have intermediate Plus and need 100-day supply? (This applies to ALL medications) Patient does not have SCP

## 2025-05-27 ENCOUNTER — OFFICE VISIT (OUTPATIENT)
Dept: SLEEP MEDICINE | Facility: MEDICAL CENTER | Age: 45
End: 2025-05-27
Attending: STUDENT IN AN ORGANIZED HEALTH CARE EDUCATION/TRAINING PROGRAM
Payer: MEDICAID

## 2025-05-27 VITALS
HEART RATE: 104 BPM | RESPIRATION RATE: 16 BRPM | WEIGHT: 262 LBS | SYSTOLIC BLOOD PRESSURE: 110 MMHG | HEIGHT: 67 IN | OXYGEN SATURATION: 94 % | BODY MASS INDEX: 41.12 KG/M2 | DIASTOLIC BLOOD PRESSURE: 80 MMHG

## 2025-05-27 DIAGNOSIS — G47.33 OSA (OBSTRUCTIVE SLEEP APNEA): Primary | ICD-10-CM

## 2025-05-27 PROCEDURE — 99213 OFFICE O/P EST LOW 20 MIN: CPT | Performed by: STUDENT IN AN ORGANIZED HEALTH CARE EDUCATION/TRAINING PROGRAM

## 2025-05-27 PROCEDURE — 3079F DIAST BP 80-89 MM HG: CPT | Performed by: STUDENT IN AN ORGANIZED HEALTH CARE EDUCATION/TRAINING PROGRAM

## 2025-05-27 PROCEDURE — 3074F SYST BP LT 130 MM HG: CPT | Performed by: STUDENT IN AN ORGANIZED HEALTH CARE EDUCATION/TRAINING PROGRAM

## 2025-05-27 ASSESSMENT — FIBROSIS 4 INDEX: FIB4 SCORE: 0.89

## 2025-05-27 NOTE — PROGRESS NOTES
[facility] Follow-up Visit    CC: sleep study follow up      HPI:  Lidia Salcedo is a 45 y.o. postmenopausal female with moderate intellectual disability with metabolic syndrome, GERD, chronic gout, chronic pain, hypertension, BPD, s/p hysterectomy and bilateral oophorectomy who presents to Sleep Clinic  for sleep study follow up.    Last seen by me 12/20/2024 for initial SDB consultation. Much of the history obtained by her caregiver given her intellectual disability. She lives in an assisted living home. She has some fatigue and sleepiness during the day. Has snoring/nocturia during the night      Pertinent meds: Klonopin 0.5 mg 3 times daily, duloxetine 60 mg, Risperdal 0.5 mg    Today, we discussed sleep study results. Patient and CG amenable to titration PSG given severity of FREYA    Sleep Schedule  Bedtime: 8:30 pm  Wake time: 7 am  Sleep-onset latency: within minutes  Awakenings from sleep: ~2x nocturia  Difficulty falling back asleep: no  Bedroom partner: no  Naps: Yes, 1-2x/wk    Sleep History  2/7/25 Diagnostic PSG - AHI (4% ) 39.88 with nocturnal desaturation - dahlia saturation 84% - saturations <88% below for 6.2 minutes of TST. Patient did not meet criteria for split-night protocol based on reduced TST prior to 2 am. FREYA worse during REM sleep (REM AHI: 56.0), Patient had enuresis. Would recommend CPAP/BiPAP PSG titration given severity of FREYA if patient amenable     Patient Active Problem List    Diagnosis Date Noted    Encounter to discuss x-ray results 09/03/2024    Encounter to discuss test results 09/03/2024    Primary osteoarthritis of right knee 09/03/2024    Urge incontinence of urine 10/16/2022    Tinea corporis 07/14/2022    Moderate intellectual disability 06/28/2022    Uterine leiomyoma 06/28/2022    Other microscopic hematuria 05/12/2022    Hyperlipidemia 05/12/2022    Fatigue 04/07/2022    Cellulitis 06/10/2021    Obesity with body mass index 30 or greater 04/29/2021    Acute  respiratory failure with hypoxia ([facility]) 12/19/2020    COVID-19 12/19/2020    Mental developmental delay     Psychiatric problem     Morbid obesity with body mass index of 40.0-44.9 in adult ([facility])     Prediabetes 02/13/2020    Elevated blood pressure reading without diagnosis of hypertension 01/28/2020    Impacted cerumen of right ear 01/28/2020    Unspecified hearing loss, right ear 01/28/2020    Mood disorder ([facility]) 01/28/2020    Obesity 01/28/2020    Urinary tract infection, acute 08/07/2019    Hx of hysterectomy 11/07/2018    Insect bites 08/19/2015    Acne 06/03/2015    Dandruff 06/03/2015    Pelvic mass 02/12/2015    Bipolar affective disorder ([facility]) 08/20/2014       Past Medical History[1]     Past Surgical History[2]    History reviewed. No pertinent family history.    Social History     Socioeconomic History    Marital status: Single     Spouse name: Not on file    Number of children: Not on file    Years of education: Not on file    Highest education level: Not on file   Occupational History    Not on file   Tobacco Use    Smoking status: Never    Smokeless tobacco: Never   Vaping Use    Vaping status: Never Used   Substance and Sexual Activity    Alcohol use: No    Drug use: No    Sexual activity: Not on file   Other Topics Concern    Not on file   Social History Narrative    Not on file     Social Drivers of Health     Financial Resource Strain: Not on file   Food Insecurity: Not on file   Transportation Needs: Not on file   Physical Activity: Not on file   Stress: Not on file   Social Connections: Not on file   Intimate Partner Violence: Not on file   Housing Stability: Not on file       Current Medications[3]     ALLERGIES: Nkda [no known drug allergy]    ROS  Constitutional: Denies fevers, Denies weight changes  Ears/Nose/Throat/Mouth: Denies nasal congestion or sore throat   Cardiovascular: Denies chest pain  Respiratory: Denies shortness of breath, Denies cough  Gastrointestinal/Hepatic: Denies nausea,  "vomiting  Sleep: see HPI      PHYSICAL EXAM  /80 (BP Location: Left arm, Patient Position: Sitting, BP Cuff Size: Large adult)   Pulse (!) 104   Resp 16   Ht 1.702 m (5' 7\")   Wt 119 kg (262 lb)   LMP 01/19/2015   SpO2 94%   BMI 41.04 kg/m²   Appearance: Well-nourished, well-developed, no acute distress  Eyes:  No scleral icterus , EOMI  Musculoskeletal:  Grossly normal; gait and station normal; digits and nails normal  Skin:  No rashes, petechiae, cyanosis  Neurologic: without focal signs; oriented to person, time, place, and purpose; judgement intact      Medical Decision Making   Assessment and Plan  45 y.o. postmenopausal female with moderate intellectual disability with metabolic syndrome, GERD, chronic gout, chronic pain, hypertension, BPD, s/p hysterectomy and bilateral oophorectomy who presents to Sleep Clinic  for sleep study follow up.    PLAN:   -CPAP/BiPAP titration PSG ordered given severity of FREYA  -Encouraged to follow-up 1 to 2 weeks after sleep study to go over results and initiate appropriate therapy  -Advised to reach out via MyChart with questions     Patients with FREYA are at increased risk of cardiovascular disease including coronary artery disease, systemic arterial hypertension, pulmonary arterial hypertension, cardiac arrythmias, and stroke. The patient was advised to avoid driving a motor vehicle when drowsy.    Positive airway pressure will favorably impact many of the adverse conditions and effects provoked by FREYA.    Have advised the patient to follow up with the appropriate healthcare practitioners for all other medical problems and issues.    Return in about 6 weeks (around 7/8/2025) for sleep study results.      Please note portions of this record was created using voice recognition software. I have made every reasonable attempt to correct obvious errors, but I expect that there are errors of grammar and possibly content I did not discover before finalizing the note.       " "    [1]   Past Medical History:  Diagnosis Date    Mental developmental delay     pt mentally \"6 years old\"    Other specified symptom associated with female genital organs     Primary osteoarthritis of right knee 05/09/2018    Unspecified urinary incontinence    [2]   Past Surgical History:  Procedure Laterality Date    HYSTERECTOMY ROBOTIC XI  2/12/2015    Procedure: RIGHT SALPINGECTOMY;  Surgeon: Donald Damon M.D.;  Location: SURGERY [facility];  Service:     OTHER      cyst removed, ovary removed   [3]   Current Outpatient Medications   Medication Sig Dispense Refill    meloxicam (MOBIC) 15 MG tablet TAKE 1 TABLET BY MOUTH 1 TIME DAILY 30 Tablet 0    ketoconazole (NIZORAL) 2 % shampoo USE 5 ML TO WASH ONCE A DAY AS NEEDED FOR ITCHING 120 mL 0    miconazole (MICOTIN) 2 % Cream Apply  topically 2 times a day.      mirabegron ER (MYRBETRIQ) 25 MG TABLET SR 24 HR Take  by mouth every day.      diclofenac sodium (VOLTAREN) 1 % Gel Apply 2 g topically 4 times a day as needed (apply to right knee as needed for pain). 350 g 2    atorvastatin (LIPITOR) 20 MG Tab Take 1 Tablet by mouth every evening. 90 Tablet 3    metFORMIN (GLUCOPHAGE) 500 MG Tab Take 1 Tablet by mouth 2 times a day with meals. 180 Tablet 3    citalopram (CELEXA) 40 MG Tab Take 40 mg by mouth every day.      clonazePAM (KLONOPIN) 0.5 MG Tab Take 0.5 mg by mouth 3 times a day.      risperiDONE (RISPERDAL) 0.5 MG Tab       DULoxetine (CYMBALTA) 60 MG Cap DR Particles delayed-release capsule       divalproex (DEPAKOTE SPRINKLE) 125 MG Capsule Delayed Release Sprinkle       acetaminophen (TYLENOL) 500 MG Tab Take 1-2 Tablets by mouth every 6 hours as needed for Moderate Pain. (Patient not taking: Reported on 5/27/2025) 30 Tablet 0    oxybutynin SR (DITROPAN-XL) 5 MG TABLET SR 24 HR TAKE 1 TABLET BY MOUTH 1 TIME DAILY (Patient not taking: Reported on 5/27/2025) 30 Tablet 0    influenza vaccine quad (FLUZONE QUADRIVALENT) 0.5 ML Suspension Prefilled " Syringe injection Inject  into the shoulder, thigh, or buttocks. (Patient not taking: Reported on 5/27/2025) 0.5 mL 0    Multiple Vitamins-Minerals (CEROVITE PO) Take 1 Tab by mouth every morning. (Patient not taking: Reported on 9/11/2024)       No current facility-administered medications for this visit.

## 2025-05-29 NOTE — Clinical Note
REFERRAL APPROVAL NOTICE         Sent on May 29, 2025                   Lidia Salcedo  [address] [place] [address]                   Dear MsBrendan Salcedo,    After a careful review of the medical information and benefit coverage, [facility] has processed your referral. See below for additional details.    If applicable, you must be actively enrolled with your insurance for coverage of the authorized service. If you have any questions regarding your coverage, please contact your insurance directly.    REFERRAL INFORMATION   Referral #:  [id number]  Referred-To Department    Referred-By Provider:  Pulmonary and Sleep Medicine    Evelyn Hercules M.D.   [facility]      [address] [facility] [facility] [address]  [phone] [address] [facility] [id number] [phone]    Referral Start Date:  05/27/2025  Referral End Date:   05/27/2026             SCHEDULING  If you do not already have an appointment, please call [phone] to make an appointment.     MORE INFORMATION  If you do not already have a [facility] account, sign up at: [email or web address]  You can access your medical information, make appointments, see lab results, billing information, and more.  If you have questions regarding this referral, please contact  the [facility] Referrals department at:             [phone]. Monday - Friday 8:00AM - 5:00PM.     Sincerely,    [facility]

## 2025-06-12 ENCOUNTER — APPOINTMENT (OUTPATIENT)
Dept: MEDICAL GROUP | Facility: CLINIC | Age: 45
End: 2025-06-12
Payer: MEDICAID

## 2025-06-16 DIAGNOSIS — M17.11 PRIMARY OSTEOARTHRITIS OF RIGHT KNEE: ICD-10-CM

## 2025-06-16 DIAGNOSIS — B35.0: ICD-10-CM

## 2025-06-16 NOTE — TELEPHONE ENCOUNTER
Received request via: Pharmacy    Was the patient seen in the last year in this department? Yes    Does the patient have an active prescription (recently filled or refills available) for medication(s) requested? No    Pharmacy Name: [facility]    Does the patient have [facility] Plus and need 100-day supply? (This applies to ALL medications) Patient does not have SCP

## 2025-06-17 RX ORDER — KETOCONAZOLE 20 MG/ML
SHAMPOO, SUSPENSION TOPICAL
Qty: 120 ML | Refills: 0 | Status: SHIPPED | OUTPATIENT
Start: 2025-06-17

## 2025-06-17 RX ORDER — MELOXICAM 15 MG/1
15 TABLET ORAL DAILY
Qty: 30 TABLET | Refills: 0 | Status: SHIPPED | OUTPATIENT
Start: 2025-06-17

## 2025-06-18 ENCOUNTER — TELEPHONE (OUTPATIENT)
Dept: HEALTH INFORMATION MANAGEMENT | Facility: OTHER | Age: 45
End: 2025-06-18
Payer: MEDICAID

## 2025-06-20 ENCOUNTER — PATIENT MESSAGE (OUTPATIENT)
Dept: SCHEDULING | Facility: IMAGING CENTER | Age: 45
End: 2025-06-20
Payer: MEDICAID

## 2025-06-27 ENCOUNTER — OFFICE VISIT (OUTPATIENT)
Dept: MEDICAL GROUP | Facility: CLINIC | Age: 45
End: 2025-06-27
Payer: MEDICAID

## 2025-06-27 ENCOUNTER — HOSPITAL ENCOUNTER (OUTPATIENT)
Facility: MEDICAL CENTER | Age: 45
End: 2025-06-27
Payer: MEDICAID

## 2025-06-27 VITALS
TEMPERATURE: 97 F | BODY MASS INDEX: 38.44 KG/M2 | HEART RATE: 110 BPM | HEIGHT: 69 IN | OXYGEN SATURATION: 93 % | WEIGHT: 259.5 LBS | SYSTOLIC BLOOD PRESSURE: 132 MMHG | DIASTOLIC BLOOD PRESSURE: 91 MMHG

## 2025-06-27 DIAGNOSIS — F71 MODERATE INTELLECTUAL DISABILITY: ICD-10-CM

## 2025-06-27 DIAGNOSIS — Z00.00 ANNUAL PHYSICAL EXAM: Primary | ICD-10-CM

## 2025-06-27 DIAGNOSIS — Z00.00 ANNUAL PHYSICAL EXAM: ICD-10-CM

## 2025-06-27 LAB
ALBUMIN SERPL BCP-MCNC: 4.3 G/DL (ref 3.2–4.9)
ALBUMIN/GLOB SERPL: 1.4 G/DL
ALP SERPL-CCNC: 73 U/L (ref 30–99)
ALT SERPL-CCNC: 35 U/L (ref 2–50)
ANION GAP SERPL CALC-SCNC: 14 MMOL/L (ref 7–16)
AST SERPL-CCNC: 27 U/L (ref 12–45)
BASOPHILS # BLD AUTO: 0.6 % (ref 0–1.8)
BASOPHILS # BLD: 0.06 K/UL (ref 0–0.12)
BILIRUB SERPL-MCNC: 0.2 MG/DL (ref 0.1–1.5)
BUN SERPL-MCNC: 20 MG/DL (ref 8–22)
CALCIUM ALBUM COR SERPL-MCNC: 9.5 MG/DL (ref 8.5–10.5)
CALCIUM SERPL-MCNC: 9.7 MG/DL (ref 8.5–10.5)
CHLORIDE SERPL-SCNC: 107 MMOL/L (ref 96–112)
CHOLEST SERPL-MCNC: 181 MG/DL (ref 100–199)
CO2 SERPL-SCNC: 21 MMOL/L (ref 20–33)
CREAT SERPL-MCNC: 0.8 MG/DL (ref 0.5–1.4)
EOSINOPHIL # BLD AUTO: 0.19 K/UL (ref 0–0.51)
EOSINOPHIL NFR BLD: 1.8 % (ref 0–6.9)
ERYTHROCYTE [DISTWIDTH] IN BLOOD BY AUTOMATED COUNT: 42.5 FL (ref 35.9–50)
EST. AVERAGE GLUCOSE BLD GHB EST-MCNC: 117 MG/DL
GFR SERPLBLD CREATININE-BSD FMLA CKD-EPI: 92 ML/MIN/1.73 M 2
GLOBULIN SER CALC-MCNC: 3.1 G/DL (ref 1.9–3.5)
GLUCOSE SERPL-MCNC: 112 MG/DL (ref 65–99)
HBA1C MFR BLD: 5.7 % (ref 4–5.6)
HCT VFR BLD AUTO: 47.4 % (ref 37–47)
HDLC SERPL-MCNC: 45 MG/DL
HGB BLD-MCNC: 15.6 G/DL (ref 12–16)
IMM GRANULOCYTES # BLD AUTO: 0.04 K/UL (ref 0–0.11)
IMM GRANULOCYTES NFR BLD AUTO: 0.4 % (ref 0–0.9)
LDLC SERPL CALC-MCNC: 94 MG/DL
LYMPHOCYTES # BLD AUTO: 4.21 K/UL (ref 1–4.8)
LYMPHOCYTES NFR BLD: 40.4 % (ref 22–41)
MCH RBC QN AUTO: 30.8 PG (ref 27–33)
MCHC RBC AUTO-ENTMCNC: 32.9 G/DL (ref 32.2–35.5)
MCV RBC AUTO: 93.7 FL (ref 81.4–97.8)
MONOCYTES # BLD AUTO: 0.51 K/UL (ref 0–0.85)
MONOCYTES NFR BLD AUTO: 4.9 % (ref 0–13.4)
NEUTROPHILS # BLD AUTO: 5.41 K/UL (ref 1.82–7.42)
NEUTROPHILS NFR BLD: 51.9 % (ref 44–72)
NRBC # BLD AUTO: 0 K/UL
NRBC BLD-RTO: 0 /100 WBC (ref 0–0.2)
PLATELET # BLD AUTO: 231 K/UL (ref 164–446)
PMV BLD AUTO: 12.7 FL (ref 9–12.9)
POTASSIUM SERPL-SCNC: 4.2 MMOL/L (ref 3.6–5.5)
PROT SERPL-MCNC: 7.4 G/DL (ref 6–8.2)
RBC # BLD AUTO: 5.06 M/UL (ref 4.2–5.4)
SODIUM SERPL-SCNC: 142 MMOL/L (ref 135–145)
TRIGL SERPL-MCNC: 210 MG/DL (ref 0–149)
TSH SERPL DL<=0.005 MIU/L-ACNC: 2.28 UIU/ML (ref 0.38–5.33)
WBC # BLD AUTO: 10.4 K/UL (ref 4.8–10.8)

## 2025-06-27 PROCEDURE — 80053 COMPREHEN METABOLIC PANEL: CPT

## 2025-06-27 PROCEDURE — 84443 ASSAY THYROID STIM HORMONE: CPT

## 2025-06-27 PROCEDURE — 85025 COMPLETE CBC W/AUTO DIFF WBC: CPT

## 2025-06-27 PROCEDURE — 36415 COLL VENOUS BLD VENIPUNCTURE: CPT

## 2025-06-27 PROCEDURE — 80061 LIPID PANEL: CPT

## 2025-06-27 PROCEDURE — 83036 HEMOGLOBIN GLYCOSYLATED A1C: CPT

## 2025-06-27 NOTE — ASSESSMENT & PLAN NOTE
45-year-old female here for annual exam  Presents with caregiver  /91, BMI of 38  Diet has improved and has lost 3 pounds since May  No changes in diagnoses no additional medications.  Followed by psych  No concerns or complaints    Plan  - Labs ordered CBC, CMP, lipid panel, A1c, TSH with reflex T4  - Form completed and given back to caregiver  - Follow-up in 1 year

## 2025-06-27 NOTE — PROGRESS NOTES
Nephrology Progress Note      Patient: Sherice Jewell               Sex: female            MRN:  [id number]      YOB: 1943      Age:  77 year old           Date: 2/16/2021    Subjective:     Resting comfortably. No N/V/SOB    PMHx, FHx, SHx, and review of systems reviewed and otherwise unchanged.    Reviewed on 2/16/2021    Objective:     Visit Vitals  /61 (BP Location: LUE - Left upper extremity, Patient Position: Semi-Chatterjee's)   Pulse 73   Temp 97.9 °F (36.6 °C) (Oral)   Resp 17   Ht 5' 6\" (1.676 m)   Wt 52.1 kg (114 lb 13.8 oz)   SpO2 96%   BMI 18.54 kg/m²      I/O last 3 completed shifts:  In: 2530.5 [P.O.:780]  Out: 1300 [Urine:1300]    Physical Exam:  General Appearance: Alert, no distress   HEENT:  Extraocular motion intact, NG tube   Neck: Trachea midline, no adenopathy, no JVD   Lungs:   Clear to auscultation bilaterally, respirations unlabored   Heart:  Regular rate and rhythm, no murmurs or rub   Gastrointestinal:   Soft, non-tender, bowel sounds active   Musculoskeletal: No cyanosis or edema   Skin: No rashes or lesions   Neuro: Answers questions appropriately     Lab/Data Reviewed:  Recent Labs   Lab 02/16/21  0503 02/15/21  0443 02/14/21  0437 02/13/21  0335 02/12/21  0517   SODIUM 136 133* 134* 133* 134*   POTASSIUM 4.2 4.2 4.4 3.8 4.0   CHLORIDE 106 104 104 101 105   CO2 25 25 27 27 26   BUN 32* 30* 26* 19 20   CREATININE 0.89 0.93 0.83 0.86 1.05*   GLUCOSE 98 107* 88 151* 109*   CALCIUM 8.2* 8.0* 7.6* 7.9* 7.9*   PHOS 4.4 3.3 2.4 2.9 3.6   MG 2.1 2.1 2.2 1.8 2.1      Recent Labs   Lab 02/16/21  0503 02/15/21  0443 02/14/21  0437   WBC 7.0 8.5 12.1*   HGB 9.1* 9.4* 10.3*   HCT 28.1* 29.5* 31.3*    175 160          Assessment/Plan   1. Hyponatremia - Secondary to nonosmotic ADH drive, and poor p.o. intake.     - Urine electrolytes and  urine osmolality consistent with SIADH.   - Improved to goal, adjust TPN as needed  -Tolerating full liquids as well, advance as  "  [facility]    Subjective:     CC: Annual exam    HPI:   Lidia is a 45 y.o. female with:    Problem   Annual Physical Exam    Presents to clinic for annual exam  Caregiver present  No concerns or complaints  Had an accident while in the waiting room due to patient having suspenders, not a normal occurrence.  The staff open working to improve patient's diet she is lost 3 pounds since May  Followed by psychiatry  Do not need medications refilled at this time       Current Medications and Prescriptions Ordered in Epic[1]    ROS:  Negative except as noted in HPI      Objective:     Exam:  BP (!) 132/91 (BP Location: Left arm, Patient Position: Sitting, BP Cuff Size: Adult)   Pulse (!) 110   Temp 36.1 °C (97 °F) (Temporal)   Ht 1.753 m (5' 9\")   Wt 118 kg (259 lb 8 oz)   LMP 01/19/2015   SpO2 93%   BMI 38.32 kg/m²  Body mass index is 38.32 kg/m².    Physical Exam  Constitutional:       General: She is not in acute distress.     Appearance: She is obese. She is not ill-appearing.   HENT:      Mouth/Throat:      Mouth: Mucous membranes are moist.   Cardiovascular:      Rate and Rhythm: Normal rate and regular rhythm.      Heart sounds: No murmur heard.  Pulmonary:      Effort: Pulmonary effort is normal. No respiratory distress.   Abdominal:      General: There is no distension.      Palpations: Abdomen is soft.   Musculoskeletal:         General: No swelling.   Neurological:      Mental Status: She is alert.      Motor: No weakness.   Psychiatric:      Comments: Behavior baseline       Labs:     Results for orders placed or performed during the hospital encounter of 01/17/25   URINE CULTURE(NEW)    Collection Time: 01/17/25  4:50 PM    Specimen: Urine   Result Value Ref Range    Significant Indicator NEG     Source UR     Site -     Culture Result Usual urogenital cisco ,000 cfu/mL      Assessment & Plan:     45 y.o. female with the following -     Problem List Items Addressed This Visit       " Moderate intellectual disability    Annual physical exam - Primary    45-year-old female here for annual exam  Presents with caregiver  /91, BMI of 38  Diet has improved and has lost 3 pounds since May  No changes in diagnoses no additional medications.  Followed by psych  No concerns or complaints    Plan  - Labs ordered CBC, CMP, lipid panel, A1c, TSH with reflex T4  - Form completed and given back to caregiver  - Follow-up in 1 year             Relevant Orders    CBC WITH DIFFERENTIAL    Comp Metabolic Panel    HEMOGLOBIN A1C    Lipid Profile    TSH WITH REFLEX TO FT4       Return in about 1 year (around 6/27/2026).      I advised the patient that I will contact them with all lab, imaging, or procedure results within a week of having the test performed. If they have not received a message/call from our clinic during that expected time period, they are advised to contact our clinic to ensure that the labs/imaging studies were received by this office.     Ramona Field MD  [facility]  PGY-3               [1]   Current Outpatient Medications Ordered in Epic   Medication Sig Dispense Refill    ketoconazole (NIZORAL) 2 % shampoo USE 5 ML TO WASH ONCE A DAY AS NEEDED FOR ITCHING 120 mL 0    meloxicam (MOBIC) 15 MG tablet TAKE 1 TABLET BY MOUTH 1 TIME DAILY 30 Tablet 0    miconazole (MICOTIN) 2 % Cream Apply  topically 2 times a day.      mirabegron ER (MYRBETRIQ) 25 MG TABLET SR 24 HR Take  by mouth every day.      diclofenac sodium (VOLTAREN) 1 % Gel Apply 2 g topically 4 times a day as needed (apply to right knee as needed for pain). 350 g 2    atorvastatin (LIPITOR) 20 MG Tab Take 1 Tablet by mouth every evening. 90 Tablet 3    metFORMIN (GLUCOPHAGE) 500 MG Tab Take 1 Tablet by mouth 2 times a day with meals. 180 Tablet 3    citalopram (CELEXA) 40 MG Tab Take 40 mg by mouth every day.      clonazePAM (KLONOPIN) 0.5 MG Tab Take 0.5 mg by mouth 3 times a day.      risperiDONE (RISPERDAL) 0.5 MG  tolerated     2. Nausea with emesis.  Per GI and Sugery  - Resume  TPN, orders per pharmacy  -Follow chemistries closely    3. DANGELO -stable kidney function.  Improved urine output  - Monitoring for retention.      4. Large inguinal hernia S/p robotic hiatal hernia repair on 2/12    Will follow as needed. Thank you     [facility]   Office Phone: [phone]  Office Fax: [phone]   Tab       DULoxetine (CYMBALTA) 60 MG Cap DR Particles delayed-release capsule       divalproex (DEPAKOTE SPRINKLE) 125 MG Capsule Delayed Release Sprinkle       acetaminophen (TYLENOL) 500 MG Tab Take 1-2 Tablets by mouth every 6 hours as needed for Moderate Pain. (Patient not taking: Reported on 6/27/2025) 30 Tablet 0    oxybutynin SR (DITROPAN-XL) 5 MG TABLET SR 24 HR TAKE 1 TABLET BY MOUTH 1 TIME DAILY (Patient not taking: Reported on 6/27/2025) 30 Tablet 0    influenza vaccine quad (FLUZONE QUADRIVALENT) 0.5 ML Suspension Prefilled Syringe injection Inject  into the shoulder, thigh, or buttocks. (Patient not taking: Reported on 6/27/2025) 0.5 mL 0    Multiple Vitamins-Minerals (CEROVITE PO) Take 1 Tab by mouth every morning. (Patient not taking: Reported on 6/27/2025)       No current [facility]-ordered facility-administered medications on file.

## 2025-07-08 ENCOUNTER — APPOINTMENT (OUTPATIENT)
Dept: LAB | Facility: MEDICAL CENTER | Age: 45
End: 2025-07-08
Payer: MEDICAID

## 2025-07-18 DIAGNOSIS — E78.5 HYPERLIPIDEMIA, UNSPECIFIED HYPERLIPIDEMIA TYPE: ICD-10-CM

## 2025-07-18 DIAGNOSIS — R73.03 PREDIABETES: ICD-10-CM

## 2025-07-18 NOTE — TELEPHONE ENCOUNTER
Received request via: Pharmacy    Was the patient seen in the last year in this department? Yes    Does the patient have an active prescription (recently filled or refills available) for medication(s) requested? No    Pharmacy Name: [facility], [id number]     Does the patient have alf Plus and need 100-day supply? (This applies to ALL medications) Patient does not have SCP

## 2025-07-19 RX ORDER — ATORVASTATIN CALCIUM 20 MG/1
20 TABLET, FILM COATED ORAL EVERY EVENING
Qty: 30 TABLET | Refills: 0 | Status: SHIPPED | OUTPATIENT
Start: 2025-07-19

## 2025-07-29 ENCOUNTER — PATIENT MESSAGE (OUTPATIENT)
Dept: MEDICAL GROUP | Facility: CLINIC | Age: 45
End: 2025-07-29
Payer: MEDICAID

## 2025-07-29 DIAGNOSIS — M17.11 PRIMARY OSTEOARTHRITIS OF RIGHT KNEE: ICD-10-CM

## 2025-07-31 RX ORDER — MELOXICAM 15 MG/1
15 TABLET ORAL DAILY
Qty: 30 TABLET | Refills: 0 | Status: SHIPPED | OUTPATIENT
Start: 2025-07-31

## 2025-08-04 ENCOUNTER — APPOINTMENT (OUTPATIENT)
Dept: SLEEP MEDICINE | Facility: MEDICAL CENTER | Age: 45
End: 2025-08-04
Attending: STUDENT IN AN ORGANIZED HEALTH CARE EDUCATION/TRAINING PROGRAM
Payer: MEDICAID

## 2025-08-12 DIAGNOSIS — R73.03 PREDIABETES: ICD-10-CM

## 2025-08-12 DIAGNOSIS — E78.5 HYPERLIPIDEMIA, UNSPECIFIED HYPERLIPIDEMIA TYPE: ICD-10-CM

## 2025-08-13 RX ORDER — ATORVASTATIN CALCIUM 20 MG/1
20 TABLET, FILM COATED ORAL EVERY EVENING
Qty: 30 TABLET | Refills: 0 | Status: SHIPPED | OUTPATIENT
Start: 2025-08-13

## 2025-08-18 ENCOUNTER — OFFICE VISIT (OUTPATIENT)
Dept: URGENT CARE | Facility: PHYSICIAN GROUP | Age: 45
End: 2025-08-18
Payer: MEDICAID

## 2025-08-18 VITALS
TEMPERATURE: 97.5 F | HEART RATE: 106 BPM | RESPIRATION RATE: 14 BRPM | OXYGEN SATURATION: 95 % | DIASTOLIC BLOOD PRESSURE: 74 MMHG | BODY MASS INDEX: 35.08 KG/M2 | HEIGHT: 72 IN | WEIGHT: 259 LBS | SYSTOLIC BLOOD PRESSURE: 136 MMHG

## 2025-08-18 DIAGNOSIS — Z71.1 FEARED COMPLAINT WITHOUT DIAGNOSIS: Primary | ICD-10-CM

## 2025-08-18 PROCEDURE — 99213 OFFICE O/P EST LOW 20 MIN: CPT | Performed by: STUDENT IN AN ORGANIZED HEALTH CARE EDUCATION/TRAINING PROGRAM

## 2025-08-18 ASSESSMENT — FIBROSIS 4 INDEX: FIB4 SCORE: 0.91

## 2025-08-25 DIAGNOSIS — M17.11 PRIMARY OSTEOARTHRITIS OF RIGHT KNEE: ICD-10-CM

## 2025-08-26 DIAGNOSIS — B35.0: ICD-10-CM

## 2025-08-27 RX ORDER — MELOXICAM 15 MG/1
15 TABLET ORAL DAILY
Qty: 30 TABLET | Refills: 0 | Status: SHIPPED | OUTPATIENT
Start: 2025-08-27